# Patient Record
Sex: FEMALE | Race: WHITE | NOT HISPANIC OR LATINO | Employment: OTHER | URBAN - METROPOLITAN AREA
[De-identification: names, ages, dates, MRNs, and addresses within clinical notes are randomized per-mention and may not be internally consistent; named-entity substitution may affect disease eponyms.]

---

## 2005-07-07 LAB — PATHOLOGY ICD PATIENT: NORMAL

## 2017-01-06 ENCOUNTER — OFFICE VISIT (OUTPATIENT)
Dept: FAMILY MEDICINE | Age: 62
End: 2017-01-06

## 2017-01-06 VITALS
WEIGHT: 149 LBS | DIASTOLIC BLOOD PRESSURE: 64 MMHG | SYSTOLIC BLOOD PRESSURE: 98 MMHG | BODY MASS INDEX: 29.59 KG/M2 | HEART RATE: 79 BPM

## 2017-01-06 DIAGNOSIS — R30.0 BURNING WITH URINATION: ICD-10-CM

## 2017-01-06 DIAGNOSIS — N95.2 VAGINAL ATROPHY: Primary | ICD-10-CM

## 2017-01-06 LAB
APPEARANCE UR: CLEAR
BILIRUB UR QL: NORMAL
COLOR UR: YELLOW
GLUCOSE UR-MCNC: NORMAL MG/DL
HGB UR QL: NORMAL
KETONES UR-MCNC: NORMAL MG/DL
LEUKOCYTE ESTERASE UR QL STRIP: NORMAL
NITRITE UR QL: NORMAL
PH UR: 6 [PH]
PROT UR QL: NORMAL
SP GR UR: 1.01
SPECIMEN SOURCE: NORMAL
UROBILINOGEN UR QL: NORMAL

## 2017-01-06 PROCEDURE — 87660 TRICHOMONAS VAGIN DIR PROBE: CPT | Performed by: INTERNAL MEDICINE

## 2017-01-06 PROCEDURE — 87510 GARDNER VAG DNA DIR PROBE: CPT | Performed by: INTERNAL MEDICINE

## 2017-01-06 PROCEDURE — 99213 OFFICE O/P EST LOW 20 MIN: CPT | Performed by: PHYSICIAN ASSISTANT

## 2017-01-06 PROCEDURE — 81002 URINALYSIS NONAUTO W/O SCOPE: CPT | Performed by: PHYSICIAN ASSISTANT

## 2017-01-06 PROCEDURE — 87480 CANDIDA DNA DIR PROBE: CPT | Performed by: INTERNAL MEDICINE

## 2017-01-07 LAB
CANDIDA RRNA VAG QL PROBE: NEGATIVE
G VAGINALIS RRNA GENITAL QL PROBE: NEGATIVE
T VAGINALIS RRNA GENITAL QL PROBE: NEGATIVE

## 2017-01-09 ENCOUNTER — TELEPHONE (OUTPATIENT)
Dept: FAMILY MEDICINE | Age: 62
End: 2017-01-09

## 2017-04-01 ENCOUNTER — APPOINTMENT (OUTPATIENT)
Dept: CT IMAGING | Age: 62
End: 2017-04-01
Attending: EMERGENCY MEDICINE

## 2017-04-01 ENCOUNTER — HOSPITAL ENCOUNTER (EMERGENCY)
Age: 62
Discharge: HOME | End: 2017-04-01
Attending: EMERGENCY MEDICINE

## 2017-04-01 VITALS
OXYGEN SATURATION: 97 % | TEMPERATURE: 98.1 F | RESPIRATION RATE: 14 BRPM | DIASTOLIC BLOOD PRESSURE: 80 MMHG | WEIGHT: 146 LBS | BODY MASS INDEX: 29.43 KG/M2 | SYSTOLIC BLOOD PRESSURE: 140 MMHG | HEIGHT: 59 IN | HEART RATE: 78 BPM

## 2017-04-01 DIAGNOSIS — S06.0XAA CONCUSSION: Primary | ICD-10-CM

## 2017-04-01 LAB
ANION GAP SERPL CALC-SCNC: 12 MMOL/L (ref 10–20)
APTT PPP: 23 SEC (ref 22–30)
BASOPHILS # BLD AUTO: 0 K/MCL (ref 0–0.3)
BASOPHILS NFR BLD AUTO: 0 %
BUN SERPL-MCNC: 26 MG/DL (ref 6–20)
BUN/CREAT SERPL: 28 (ref 7–25)
CALCIUM SERPL-MCNC: 9.2 MG/DL (ref 8.4–10.2)
CHLORIDE SERPL-SCNC: 101 MMOL/L (ref 98–107)
CO2 SERPL-SCNC: 32 MMOL/L (ref 21–32)
CREAT SERPL-MCNC: 0.93 MG/DL (ref 0.51–0.95)
DIFFERENTIAL METHOD BLD: ABNORMAL
EOSINOPHIL # BLD AUTO: 0.1 K/MCL (ref 0.1–0.5)
EOSINOPHIL NFR SPEC: 1 %
ERYTHROCYTE [DISTWIDTH] IN BLOOD: 12.4 % (ref 11–15)
GLUCOSE SERPL-MCNC: 107 MG/DL (ref 65–99)
HCT VFR BLD CALC: 45.5 % (ref 36–46.5)
HGB BLD-MCNC: 16.2 G/DL (ref 12–15.5)
INR PPP: 1
LYMPHOCYTES # BLD MANUAL: 1.3 K/MCL (ref 1–4)
LYMPHOCYTES NFR BLD MANUAL: 14 %
MCH RBC QN AUTO: 30.4 PG (ref 26–34)
MCHC RBC AUTO-ENTMCNC: 35.6 G/DL (ref 32–36.5)
MCV RBC AUTO: 85.4 FL (ref 78–100)
MONOCYTES # BLD MANUAL: 0.7 K/MCL (ref 0.3–0.9)
MONOCYTES NFR BLD MANUAL: 7 %
NEUTROPHILS # BLD AUTO: 7.8 K/MCL (ref 1.8–7.7)
NEUTROPHILS NFR BLD AUTO: 78 %
PLATELET # BLD: 209 K/MCL (ref 140–450)
POTASSIUM SERPL-SCNC: 3.9 MMOL/L (ref 3.4–5.1)
PROTHROMBIN TIME: 11 SEC (ref 9.7–11.8)
RBC # BLD: 5.33 MIL/MCL (ref 4–5.2)
SODIUM SERPL-SCNC: 141 MMOL/L (ref 135–145)
WBC # BLD: 9.9 K/MCL (ref 4.2–11)

## 2017-04-01 PROCEDURE — 36415 COLL VENOUS BLD VENIPUNCTURE: CPT

## 2017-04-01 PROCEDURE — 85025 COMPLETE CBC W/AUTO DIFF WBC: CPT

## 2017-04-01 PROCEDURE — 80048 BASIC METABOLIC PNL TOTAL CA: CPT

## 2017-04-01 PROCEDURE — 85610 PROTHROMBIN TIME: CPT

## 2017-04-01 PROCEDURE — 10002801 HB RX 250 W/O HCPCS

## 2017-04-01 PROCEDURE — 70450 CT HEAD/BRAIN W/O DYE: CPT

## 2017-04-01 PROCEDURE — 85730 THROMBOPLASTIN TIME PARTIAL: CPT

## 2017-04-01 PROCEDURE — 70450 CT HEAD/BRAIN W/O DYE: CPT | Performed by: RADIOLOGY

## 2017-04-01 PROCEDURE — 70486 CT MAXILLOFACIAL W/O DYE: CPT

## 2017-04-01 PROCEDURE — 70486 CT MAXILLOFACIAL W/O DYE: CPT | Performed by: RADIOLOGY

## 2017-04-01 PROCEDURE — 99284 EMERGENCY DEPT VISIT MOD MDM: CPT

## 2017-04-01 PROCEDURE — 99285 EMERGENCY DEPT VISIT HI MDM: CPT | Performed by: EMERGENCY MEDICINE

## 2017-04-01 RX ADMIN — Medication 2 ML: at 10:13

## 2017-04-01 RX ADMIN — Medication 2 ML: at 10:19

## 2017-04-01 ASSESSMENT — PAIN SCALES - GENERAL: PAINLEVEL_OUTOF10: 7

## 2017-04-03 ENCOUNTER — TELEPHONE (OUTPATIENT)
Dept: EMERGENCY MEDICINE | Age: 62
End: 2017-04-03

## 2017-04-07 ENCOUNTER — OFFICE VISIT (OUTPATIENT)
Dept: FAMILY MEDICINE | Age: 62
End: 2017-04-07

## 2017-04-07 VITALS
BODY MASS INDEX: 31.51 KG/M2 | SYSTOLIC BLOOD PRESSURE: 120 MMHG | HEART RATE: 64 BPM | WEIGHT: 156 LBS | DIASTOLIC BLOOD PRESSURE: 68 MMHG

## 2017-04-07 DIAGNOSIS — I10 ESSENTIAL HYPERTENSION: ICD-10-CM

## 2017-04-07 DIAGNOSIS — E78.00 PURE HYPERCHOLESTEROLEMIA: ICD-10-CM

## 2017-04-07 DIAGNOSIS — S06.0X1D CLOSED HEAD INJURY WITH CONCUSSION, WITH LOC OF 30 MIN OR LESS, SUBSEQUENT ENCOUNTER: Primary | ICD-10-CM

## 2017-04-07 DIAGNOSIS — E66.09 NON MORBID OBESITY DUE TO EXCESS CALORIES: ICD-10-CM

## 2017-04-07 DIAGNOSIS — E55.9 VITAMIN D DEFICIENCY: ICD-10-CM

## 2017-04-07 PROCEDURE — 99215 OFFICE O/P EST HI 40 MIN: CPT | Performed by: FAMILY MEDICINE

## 2017-04-07 RX ORDER — TRIAMTERENE AND HYDROCHLOROTHIAZIDE 75; 50 MG/1; MG/1
1 TABLET ORAL DAILY
Qty: 90 TABLET | Refills: 2 | Status: SHIPPED | OUTPATIENT
Start: 2017-04-07 | End: 2017-09-28 | Stop reason: SDUPTHER

## 2017-04-07 RX ORDER — SIMVASTATIN 10 MG
10 TABLET ORAL NIGHTLY
Qty: 90 TABLET | Refills: 2 | Status: SHIPPED | OUTPATIENT
Start: 2017-04-07 | End: 2017-09-28 | Stop reason: SDUPTHER

## 2017-04-09 PROBLEM — E55.9 VITAMIN D DEFICIENCY: Status: ACTIVE | Noted: 2017-04-09

## 2017-04-09 PROBLEM — E78.00 PURE HYPERCHOLESTEROLEMIA: Status: ACTIVE | Noted: 2017-04-09

## 2017-04-14 ENCOUNTER — TELEPHONE (OUTPATIENT)
Dept: FAMILY MEDICINE | Age: 62
End: 2017-04-14

## 2017-04-14 ENCOUNTER — HOSPITAL ENCOUNTER (OUTPATIENT)
Dept: CT IMAGING | Age: 62
Discharge: HOME | End: 2017-04-14
Attending: FAMILY MEDICINE

## 2017-04-14 DIAGNOSIS — S06.0X1D CLOSED HEAD INJURY WITH CONCUSSION, WITH LOC OF 30 MIN OR LESS, SUBSEQUENT ENCOUNTER: ICD-10-CM

## 2017-04-14 PROCEDURE — 70450 CT HEAD/BRAIN W/O DYE: CPT

## 2017-04-14 PROCEDURE — 70450 CT HEAD/BRAIN W/O DYE: CPT | Performed by: RADIOLOGY

## 2017-08-08 ENCOUNTER — TELEPHONE (OUTPATIENT)
Dept: FAMILY MEDICINE | Age: 62
End: 2017-08-08

## 2017-08-10 ENCOUNTER — LAB SERVICES (OUTPATIENT)
Dept: LAB | Age: 62
End: 2017-08-10

## 2017-08-10 DIAGNOSIS — I10 ESSENTIAL HYPERTENSION: ICD-10-CM

## 2017-08-10 DIAGNOSIS — E55.9 VITAMIN D DEFICIENCY: ICD-10-CM

## 2017-08-10 DIAGNOSIS — E78.00 PURE HYPERCHOLESTEROLEMIA: ICD-10-CM

## 2017-08-10 LAB
ALBUMIN SERPL-MCNC: 3.9 G/DL (ref 3.6–5.1)
ALBUMIN/GLOB SERPL: 1.4 {RATIO} (ref 1–2.4)
ALP SERPL-CCNC: 148 UNITS/L (ref 45–117)
ALT SERPL-CCNC: 36 UNITS/L
ANION GAP SERPL CALC-SCNC: 16 MMOL/L (ref 10–20)
AST SERPL-CCNC: 26 UNITS/L
BILIRUB SERPL-MCNC: 0.7 MG/DL (ref 0.2–1)
BUN SERPL-MCNC: 24 MG/DL (ref 6–20)
BUN/CREAT SERPL: 23 (ref 7–25)
CALCIUM SERPL-MCNC: 8.9 MG/DL (ref 8.4–10.2)
CHLORIDE SERPL-SCNC: 98 MMOL/L (ref 98–107)
CHOLEST SERPL-MCNC: 151 MG/DL
CHOLEST/HDLC SERPL: 2.9 {RATIO}
CO2 SERPL-SCNC: 29 MMOL/L (ref 21–32)
CREAT SERPL-MCNC: 1.03 MG/DL (ref 0.51–0.95)
FASTING STATUS PATIENT QL REPORTED: 14 HRS
GLOBULIN SER-MCNC: 2.7 G/DL (ref 2–4)
GLUCOSE SERPL-MCNC: 82 MG/DL (ref 65–99)
HDLC SERPL-MCNC: 52 MG/DL
LDLC SERPL-MCNC: 80 MG/DL
LENGTH OF FAST TIME PATIENT: 14 HRS
NONHDLC SERPL-MCNC: 99 MG/DL
POTASSIUM SERPL-SCNC: 3.5 MMOL/L (ref 3.4–5.1)
PROT SERPL-MCNC: 6.6 G/DL (ref 6.4–8.2)
SODIUM SERPL-SCNC: 139 MMOL/L (ref 135–145)
TRIGL SERPL-MCNC: 95 MG/DL

## 2017-08-10 PROCEDURE — 80053 COMPREHEN METABOLIC PANEL: CPT | Performed by: INTERNAL MEDICINE

## 2017-08-10 PROCEDURE — 36415 COLL VENOUS BLD VENIPUNCTURE: CPT | Performed by: INTERNAL MEDICINE

## 2017-08-10 PROCEDURE — 82306 VITAMIN D 25 HYDROXY: CPT | Performed by: INTERNAL MEDICINE

## 2017-08-10 PROCEDURE — 80061 LIPID PANEL: CPT | Performed by: INTERNAL MEDICINE

## 2017-08-11 LAB — 25(OH)D3+25(OH)D2 SERPL-MCNC: 51.6 NG/ML (ref 30–100)

## 2017-08-14 ENCOUNTER — TELEPHONE (OUTPATIENT)
Dept: FAMILY MEDICINE | Age: 62
End: 2017-08-14

## 2017-08-14 DIAGNOSIS — I10 ESSENTIAL HYPERTENSION: ICD-10-CM

## 2017-08-14 DIAGNOSIS — E78.00 PURE HYPERCHOLESTEROLEMIA: Primary | ICD-10-CM

## 2017-09-18 ENCOUNTER — HOSPITAL ENCOUNTER (OUTPATIENT)
Dept: MAMMOGRAPHY | Age: 62
Discharge: HOME | End: 2017-09-18
Attending: FAMILY MEDICINE

## 2017-09-18 ENCOUNTER — TELEPHONE (OUTPATIENT)
Dept: FAMILY MEDICINE | Age: 62
End: 2017-09-18

## 2017-09-18 DIAGNOSIS — I10 ESSENTIAL HYPERTENSION: Primary | ICD-10-CM

## 2017-09-18 DIAGNOSIS — E78.00 PURE HYPERCHOLESTEROLEMIA: ICD-10-CM

## 2017-09-18 DIAGNOSIS — Z12.31 VISIT FOR SCREENING MAMMOGRAM: ICD-10-CM

## 2017-09-18 PROCEDURE — 77063 BREAST TOMOSYNTHESIS BI: CPT

## 2017-09-18 PROCEDURE — G0202 SCR MAMMO BI INCL CAD: HCPCS | Performed by: RADIOLOGY

## 2017-09-18 PROCEDURE — 77063 BREAST TOMOSYNTHESIS BI: CPT | Performed by: RADIOLOGY

## 2017-09-28 RX ORDER — TRIAMTERENE AND HYDROCHLOROTHIAZIDE 75; 50 MG/1; MG/1
1 TABLET ORAL DAILY
Qty: 90 TABLET | Refills: 1 | Status: SHIPPED | OUTPATIENT
Start: 2017-09-28 | End: 2018-02-19 | Stop reason: SDUPTHER

## 2017-09-28 RX ORDER — SIMVASTATIN 10 MG
10 TABLET ORAL NIGHTLY
Qty: 90 TABLET | Refills: 1 | Status: SHIPPED | OUTPATIENT
Start: 2017-09-28 | End: 2018-03-13 | Stop reason: SDUPTHER

## 2018-01-01 ENCOUNTER — EXTERNAL RECORD (OUTPATIENT)
Dept: OTHER | Age: 63
End: 2018-01-01

## 2018-02-14 ENCOUNTER — TELEPHONE (OUTPATIENT)
Dept: FAMILY MEDICINE | Age: 63
End: 2018-02-14

## 2018-02-15 ENCOUNTER — OFFICE VISIT (OUTPATIENT)
Dept: FAMILY MEDICINE | Age: 63
End: 2018-02-15

## 2018-02-15 VITALS — SYSTOLIC BLOOD PRESSURE: 130 MMHG | HEART RATE: 68 BPM | DIASTOLIC BLOOD PRESSURE: 80 MMHG

## 2018-02-15 DIAGNOSIS — J06.9 VIRAL URI: Primary | ICD-10-CM

## 2018-02-15 PROCEDURE — 99213 OFFICE O/P EST LOW 20 MIN: CPT | Performed by: FAMILY MEDICINE

## 2018-02-15 ASSESSMENT — PATIENT HEALTH QUESTIONNAIRE - PHQ9
5. POOR APPETITE OR OVEREATING: NOT AT ALL
SUM OF ALL RESPONSES TO PHQ QUESTIONS 1-9: 4
SUM OF ALL RESPONSES TO PHQ9 QUESTIONS 1 TO 9: 4
CLINICAL INTERPRETATION OF PHQ2 SCORE: 3
SUM OF ALL RESPONSES TO PHQ9 QUESTIONS 1 AND 2: 3

## 2018-02-19 RX ORDER — TRIAMTERENE AND HYDROCHLOROTHIAZIDE 75; 50 MG/1; MG/1
1 TABLET ORAL DAILY
Qty: 90 TABLET | Refills: 1 | Status: SHIPPED | OUTPATIENT
Start: 2018-02-19 | End: 2018-05-02 | Stop reason: ALTCHOICE

## 2018-03-08 ENCOUNTER — LAB SERVICES (OUTPATIENT)
Dept: LAB | Age: 63
End: 2018-03-08

## 2018-03-08 DIAGNOSIS — E78.00 PURE HYPERCHOLESTEROLEMIA: ICD-10-CM

## 2018-03-08 DIAGNOSIS — I10 ESSENTIAL HYPERTENSION: ICD-10-CM

## 2018-03-08 LAB
ALBUMIN SERPL-MCNC: 4.1 G/DL (ref 3.6–5.1)
ALBUMIN/GLOB SERPL: 1.3 {RATIO} (ref 1–2.4)
ALP SERPL-CCNC: 135 UNITS/L (ref 45–117)
ALT SERPL-CCNC: 35 UNITS/L
ANION GAP SERPL CALC-SCNC: 11 MMOL/L (ref 10–20)
AST SERPL-CCNC: 23 UNITS/L
BILIRUB SERPL-MCNC: 0.8 MG/DL (ref 0.2–1)
BUN SERPL-MCNC: 22 MG/DL (ref 6–20)
BUN/CREAT SERPL: 18 (ref 7–25)
CALCIUM SERPL-MCNC: 9 MG/DL (ref 8.4–10.2)
CHLORIDE SERPL-SCNC: 99 MMOL/L (ref 98–107)
CHOLEST SERPL-MCNC: 176 MG/DL
CHOLEST/HDLC SERPL: 3.4 {RATIO}
CO2 SERPL-SCNC: 32 MMOL/L (ref 21–32)
CREAT SERPL-MCNC: 1.19 MG/DL (ref 0.51–0.95)
FASTING STATUS PATIENT QL REPORTED: ABNORMAL HRS
GLOBULIN SER-MCNC: 3.1 G/DL (ref 2–4)
GLUCOSE SERPL-MCNC: 81 MG/DL (ref 65–99)
HDLC SERPL-MCNC: 52 MG/DL
LDLC SERPL-MCNC: 107 MG/DL
LENGTH OF FAST TIME PATIENT: NORMAL HRS
NONHDLC SERPL-MCNC: 124 MG/DL
POTASSIUM SERPL-SCNC: 3.8 MMOL/L (ref 3.4–5.1)
PROT SERPL-MCNC: 7.2 G/DL (ref 6.4–8.2)
SODIUM SERPL-SCNC: 138 MMOL/L (ref 135–145)
TRIGL SERPL-MCNC: 86 MG/DL

## 2018-03-08 PROCEDURE — 36415 COLL VENOUS BLD VENIPUNCTURE: CPT | Performed by: INTERNAL MEDICINE

## 2018-03-08 PROCEDURE — 80061 LIPID PANEL: CPT | Performed by: INTERNAL MEDICINE

## 2018-03-08 PROCEDURE — 80053 COMPREHEN METABOLIC PANEL: CPT | Performed by: INTERNAL MEDICINE

## 2018-03-09 ENCOUNTER — TELEPHONE (OUTPATIENT)
Dept: FAMILY MEDICINE | Age: 63
End: 2018-03-09

## 2018-03-13 ENCOUNTER — TELEPHONE (OUTPATIENT)
Dept: FAMILY MEDICINE | Age: 63
End: 2018-03-13

## 2018-03-13 ENCOUNTER — OFFICE VISIT (OUTPATIENT)
Dept: FAMILY MEDICINE | Age: 63
End: 2018-03-13

## 2018-03-13 VITALS
DIASTOLIC BLOOD PRESSURE: 80 MMHG | SYSTOLIC BLOOD PRESSURE: 134 MMHG | BODY MASS INDEX: 33.06 KG/M2 | HEART RATE: 80 BPM | WEIGHT: 164 LBS | HEIGHT: 59 IN | RESPIRATION RATE: 16 BRPM

## 2018-03-13 DIAGNOSIS — E78.00 PURE HYPERCHOLESTEROLEMIA: ICD-10-CM

## 2018-03-13 DIAGNOSIS — N18.30 CHRONIC KIDNEY DISEASE (CKD), STAGE III (MODERATE) (CMD): ICD-10-CM

## 2018-03-13 DIAGNOSIS — E55.9 VITAMIN D DEFICIENCY: ICD-10-CM

## 2018-03-13 DIAGNOSIS — Z23 NEED FOR VACCINATION: ICD-10-CM

## 2018-03-13 DIAGNOSIS — I10 ESSENTIAL HYPERTENSION: Primary | ICD-10-CM

## 2018-03-13 PROBLEM — E66.09 CLASS 1 OBESITY DUE TO EXCESS CALORIES WITH SERIOUS COMORBIDITY AND BODY MASS INDEX (BMI) OF 33.0 TO 33.9 IN ADULT: Status: ACTIVE | Noted: 2018-03-13

## 2018-03-13 PROCEDURE — 90714 TD VACC NO PRESV 7 YRS+ IM: CPT | Performed by: FAMILY MEDICINE

## 2018-03-13 PROCEDURE — 90471 IMMUNIZATION ADMIN: CPT | Performed by: FAMILY MEDICINE

## 2018-03-13 PROCEDURE — 99214 OFFICE O/P EST MOD 30 MIN: CPT | Performed by: FAMILY MEDICINE

## 2018-03-13 RX ORDER — SIMVASTATIN 10 MG
10 TABLET ORAL NIGHTLY
Qty: 90 TABLET | Refills: 3 | Status: SHIPPED | OUTPATIENT
Start: 2018-03-13 | End: 2019-03-28 | Stop reason: SDUPTHER

## 2018-03-13 RX ORDER — SIMVASTATIN 10 MG
10 TABLET ORAL NIGHTLY
Qty: 90 TABLET | Refills: 3 | Status: SHIPPED | OUTPATIENT
Start: 2018-03-13 | End: 2018-03-13 | Stop reason: SDUPTHER

## 2018-04-30 ENCOUNTER — LAB SERVICES (OUTPATIENT)
Dept: LAB | Age: 63
End: 2018-04-30

## 2018-04-30 DIAGNOSIS — N18.30 CHRONIC KIDNEY DISEASE (CKD), STAGE III (MODERATE) (CMD): ICD-10-CM

## 2018-04-30 LAB
ANION GAP SERPL CALC-SCNC: 14 MMOL/L (ref 10–20)
APPEARANCE UR: CLEAR
BILIRUB UR QL STRIP: NEGATIVE
BUN SERPL-MCNC: 15 MG/DL (ref 6–20)
BUN/CREAT SERPL: 14 (ref 7–25)
CALCIUM SERPL-MCNC: 9.4 MG/DL (ref 8.4–10.2)
CHLORIDE SERPL-SCNC: 101 MMOL/L (ref 98–107)
CO2 SERPL-SCNC: 29 MMOL/L (ref 21–32)
COLOR UR: YELLOW
CREAT SERPL-MCNC: 1.08 MG/DL (ref 0.51–0.95)
FASTING STATUS PATIENT QL REPORTED: 3.5 HRS
GLUCOSE SERPL-MCNC: 82 MG/DL (ref 65–99)
GLUCOSE UR STRIP-MCNC: NEGATIVE MG/DL
HGB UR QL STRIP: NEGATIVE
KETONES UR STRIP-MCNC: NEGATIVE MG/DL
LEUKOCYTE ESTERASE UR QL STRIP: NEGATIVE
NITRITE UR QL STRIP: NEGATIVE
PH UR STRIP: 7 UNITS (ref 5–7)
POTASSIUM SERPL-SCNC: 3.8 MMOL/L (ref 3.4–5.1)
PROT UR STRIP-MCNC: NEGATIVE MG/DL
SODIUM SERPL-SCNC: 140 MMOL/L (ref 135–145)
SP GR UR STRIP: 1.01 (ref 1–1.03)
SPECIMEN SOURCE: NORMAL
UROBILINOGEN UR STRIP-MCNC: 0.2 MG/DL (ref 0–1)

## 2018-04-30 PROCEDURE — 80048 BASIC METABOLIC PNL TOTAL CA: CPT | Performed by: INTERNAL MEDICINE

## 2018-04-30 PROCEDURE — 36415 COLL VENOUS BLD VENIPUNCTURE: CPT | Performed by: INTERNAL MEDICINE

## 2018-04-30 PROCEDURE — 81003 URINALYSIS AUTO W/O SCOPE: CPT | Performed by: INTERNAL MEDICINE

## 2018-05-02 ENCOUNTER — TELEPHONE (OUTPATIENT)
Dept: FAMILY MEDICINE | Age: 63
End: 2018-05-02

## 2018-05-02 RX ORDER — METOPROLOL SUCCINATE 25 MG/1
25 TABLET, EXTENDED RELEASE ORAL DAILY
Qty: 30 TABLET | Refills: 5 | Status: SHIPPED | OUTPATIENT
Start: 2018-05-02 | End: 2018-09-19 | Stop reason: SDUPTHER

## 2018-05-08 ENCOUNTER — TELEPHONE (OUTPATIENT)
Dept: FAMILY MEDICINE | Age: 63
End: 2018-05-08

## 2018-05-24 ENCOUNTER — TELEPHONE (OUTPATIENT)
Dept: FAMILY MEDICINE | Age: 63
End: 2018-05-24

## 2018-05-24 ENCOUNTER — NURSE ONLY (OUTPATIENT)
Dept: FAMILY MEDICINE | Age: 63
End: 2018-05-24

## 2018-05-24 VITALS — SYSTOLIC BLOOD PRESSURE: 120 MMHG | DIASTOLIC BLOOD PRESSURE: 74 MMHG

## 2018-05-25 RX ORDER — HYDROCHLOROTHIAZIDE 12.5 MG/1
12.5 CAPSULE, GELATIN COATED ORAL DAILY
Qty: 30 CAPSULE | Refills: 5 | Status: SHIPPED | OUTPATIENT
Start: 2018-05-25 | End: 2018-09-19 | Stop reason: SDUPTHER

## 2018-07-03 ENCOUNTER — TELEPHONE (OUTPATIENT)
Dept: FAMILY MEDICINE | Age: 63
End: 2018-07-03

## 2018-07-03 ENCOUNTER — NURSE ONLY (OUTPATIENT)
Dept: FAMILY MEDICINE | Age: 63
End: 2018-07-03

## 2018-07-03 VITALS — DIASTOLIC BLOOD PRESSURE: 66 MMHG | SYSTOLIC BLOOD PRESSURE: 118 MMHG

## 2018-08-01 ENCOUNTER — TELEPHONE (OUTPATIENT)
Dept: FAMILY MEDICINE | Age: 63
End: 2018-08-01

## 2018-08-01 ENCOUNTER — NURSE ONLY (OUTPATIENT)
Dept: FAMILY MEDICINE | Age: 63
End: 2018-08-01

## 2018-08-01 VITALS — DIASTOLIC BLOOD PRESSURE: 74 MMHG | SYSTOLIC BLOOD PRESSURE: 116 MMHG

## 2018-09-13 ENCOUNTER — LAB SERVICES (OUTPATIENT)
Dept: LAB | Age: 63
End: 2018-09-13

## 2018-09-13 ENCOUNTER — HOSPITAL ENCOUNTER (OUTPATIENT)
Dept: MAMMOGRAPHY | Age: 63
Discharge: HOME | End: 2018-09-13
Attending: FAMILY MEDICINE

## 2018-09-13 DIAGNOSIS — E55.9 VITAMIN D DEFICIENCY: ICD-10-CM

## 2018-09-13 DIAGNOSIS — Z12.31 VISIT FOR SCREENING MAMMOGRAM: ICD-10-CM

## 2018-09-13 DIAGNOSIS — E78.00 PURE HYPERCHOLESTEROLEMIA: ICD-10-CM

## 2018-09-13 DIAGNOSIS — I10 ESSENTIAL HYPERTENSION: ICD-10-CM

## 2018-09-13 LAB
ALBUMIN SERPL-MCNC: 4.2 G/DL (ref 3.6–5.1)
ALBUMIN/GLOB SERPL: 1.6 {RATIO} (ref 1–2.4)
ALP SERPL-CCNC: 121 UNITS/L (ref 45–117)
ALT SERPL-CCNC: 33 UNITS/L
ANION GAP SERPL CALC-SCNC: 11 MMOL/L (ref 10–20)
AST SERPL-CCNC: 21 UNITS/L
BILIRUB SERPL-MCNC: 1.1 MG/DL (ref 0.2–1)
BUN SERPL-MCNC: 18 MG/DL (ref 6–20)
BUN/CREAT SERPL: 18 (ref 7–25)
CALCIUM SERPL-MCNC: 9.4 MG/DL (ref 8.4–10.2)
CHLORIDE SERPL-SCNC: 100 MMOL/L (ref 98–107)
CHOLEST SERPL-MCNC: 166 MG/DL
CHOLEST/HDLC SERPL: 3.1 {RATIO}
CO2 SERPL-SCNC: 33 MMOL/L (ref 21–32)
CREAT SERPL-MCNC: 0.99 MG/DL (ref 0.51–0.95)
FASTING STATUS PATIENT QL REPORTED: 16.5 HRS
GLOBULIN SER-MCNC: 2.7 G/DL (ref 2–4)
GLUCOSE SERPL-MCNC: 84 MG/DL (ref 65–99)
HDLC SERPL-MCNC: 53 MG/DL
LDLC SERPL-MCNC: 98 MG/DL
LENGTH OF FAST TIME PATIENT: 16.5 HRS
NONHDLC SERPL-MCNC: 113 MG/DL
POTASSIUM SERPL-SCNC: 3.6 MMOL/L (ref 3.4–5.1)
PROT SERPL-MCNC: 6.9 G/DL (ref 6.4–8.2)
SODIUM SERPL-SCNC: 140 MMOL/L (ref 135–145)
TRIGL SERPL-MCNC: 73 MG/DL

## 2018-09-13 PROCEDURE — 36415 COLL VENOUS BLD VENIPUNCTURE: CPT | Performed by: INTERNAL MEDICINE

## 2018-09-13 PROCEDURE — 82306 VITAMIN D 25 HYDROXY: CPT | Performed by: INTERNAL MEDICINE

## 2018-09-13 PROCEDURE — 77063 BREAST TOMOSYNTHESIS BI: CPT

## 2018-09-13 PROCEDURE — 77063 BREAST TOMOSYNTHESIS BI: CPT | Performed by: RADIOLOGY

## 2018-09-13 PROCEDURE — 80061 LIPID PANEL: CPT | Performed by: INTERNAL MEDICINE

## 2018-09-13 PROCEDURE — 77067 SCR MAMMO BI INCL CAD: CPT | Performed by: RADIOLOGY

## 2018-09-13 PROCEDURE — 80053 COMPREHEN METABOLIC PANEL: CPT | Performed by: INTERNAL MEDICINE

## 2018-09-14 ENCOUNTER — TELEPHONE (OUTPATIENT)
Dept: FAMILY MEDICINE | Age: 63
End: 2018-09-14

## 2018-09-14 LAB — 25(OH)D3+25(OH)D2 SERPL-MCNC: 53.3 NG/ML (ref 30–100)

## 2018-09-19 ENCOUNTER — OFFICE VISIT (OUTPATIENT)
Dept: FAMILY MEDICINE | Age: 63
End: 2018-09-19

## 2018-09-19 VITALS
DIASTOLIC BLOOD PRESSURE: 76 MMHG | HEIGHT: 59 IN | BODY MASS INDEX: 30.96 KG/M2 | WEIGHT: 153.6 LBS | SYSTOLIC BLOOD PRESSURE: 128 MMHG | HEART RATE: 56 BPM

## 2018-09-19 DIAGNOSIS — N18.30 CHRONIC KIDNEY DISEASE (CKD), STAGE III (MODERATE) (CMD): ICD-10-CM

## 2018-09-19 DIAGNOSIS — I10 ESSENTIAL HYPERTENSION: Primary | ICD-10-CM

## 2018-09-19 DIAGNOSIS — E78.00 PURE HYPERCHOLESTEROLEMIA: ICD-10-CM

## 2018-09-19 DIAGNOSIS — E55.9 VITAMIN D DEFICIENCY: ICD-10-CM

## 2018-09-19 DIAGNOSIS — Z23 NEED FOR VACCINATION: ICD-10-CM

## 2018-09-19 DIAGNOSIS — E66.09 CLASS 1 OBESITY DUE TO EXCESS CALORIES WITH SERIOUS COMORBIDITY AND BODY MASS INDEX (BMI) OF 33.0 TO 33.9 IN ADULT: ICD-10-CM

## 2018-09-19 DIAGNOSIS — Z11.59 NEED FOR HEPATITIS C SCREENING TEST: ICD-10-CM

## 2018-09-19 PROCEDURE — 99215 OFFICE O/P EST HI 40 MIN: CPT | Performed by: FAMILY MEDICINE

## 2018-09-19 PROCEDURE — 90688 IIV4 VACCINE SPLT 0.5 ML IM: CPT | Performed by: FAMILY MEDICINE

## 2018-09-19 PROCEDURE — 90471 IMMUNIZATION ADMIN: CPT | Performed by: FAMILY MEDICINE

## 2018-09-19 RX ORDER — METOPROLOL SUCCINATE 25 MG/1
25 TABLET, EXTENDED RELEASE ORAL DAILY
Qty: 90 TABLET | Refills: 3 | Status: SHIPPED | OUTPATIENT
Start: 2018-09-19 | End: 2019-09-05 | Stop reason: SDUPTHER

## 2018-09-19 RX ORDER — HYDROCHLOROTHIAZIDE 12.5 MG/1
12.5 CAPSULE, GELATIN COATED ORAL DAILY
Qty: 90 CAPSULE | Refills: 3 | Status: SHIPPED | OUTPATIENT
Start: 2018-09-19 | End: 2019-08-28 | Stop reason: SDUPTHER

## 2018-10-12 ENCOUNTER — TELEPHONE (OUTPATIENT)
Dept: FAMILY MEDICINE | Age: 63
End: 2018-10-12

## 2018-10-18 ENCOUNTER — OFFICE VISIT (OUTPATIENT)
Dept: FAMILY MEDICINE | Age: 63
End: 2018-10-18

## 2018-10-18 ENCOUNTER — TELEPHONE (OUTPATIENT)
Dept: FAMILY MEDICINE | Age: 63
End: 2018-10-18

## 2018-10-18 VITALS
DIASTOLIC BLOOD PRESSURE: 70 MMHG | SYSTOLIC BLOOD PRESSURE: 110 MMHG | HEIGHT: 59 IN | WEIGHT: 153.2 LBS | RESPIRATION RATE: 15 BRPM | HEART RATE: 61 BPM | TEMPERATURE: 97.6 F | BODY MASS INDEX: 30.88 KG/M2

## 2018-10-18 DIAGNOSIS — Z12.72 VAGINAL PAP SMEAR: Primary | ICD-10-CM

## 2018-10-18 DIAGNOSIS — M79.671 RIGHT FOOT PAIN: Primary | ICD-10-CM

## 2018-10-18 PROCEDURE — 88175 CYTOPATH C/V AUTO FLUID REDO: CPT | Performed by: INTERNAL MEDICINE

## 2018-10-18 PROCEDURE — 87624 HPV HI-RISK TYP POOLED RSLT: CPT | Performed by: INTERNAL MEDICINE

## 2018-10-18 PROCEDURE — 99213 OFFICE O/P EST LOW 20 MIN: CPT | Performed by: NURSE PRACTITIONER

## 2018-10-23 ENCOUNTER — TELEPHONE (OUTPATIENT)
Dept: FAMILY MEDICINE | Age: 63
End: 2018-10-23

## 2018-10-23 LAB — HPV16+18+45 E6+E7MRNA CVX NAA+PROBE: NORMAL

## 2018-11-05 ENCOUNTER — OFFICE VISIT (OUTPATIENT)
Dept: FAMILY MEDICINE | Age: 63
End: 2018-11-05

## 2018-11-05 VITALS
RESPIRATION RATE: 12 BRPM | HEART RATE: 72 BPM | HEIGHT: 59 IN | DIASTOLIC BLOOD PRESSURE: 60 MMHG | BODY MASS INDEX: 31.04 KG/M2 | SYSTOLIC BLOOD PRESSURE: 96 MMHG | WEIGHT: 154 LBS

## 2018-11-05 DIAGNOSIS — Z12.4 PAP SMEAR FOR CERVICAL CANCER SCREENING: Primary | ICD-10-CM

## 2018-11-05 PROCEDURE — 99213 OFFICE O/P EST LOW 20 MIN: CPT | Performed by: FAMILY MEDICINE

## 2018-11-05 PROCEDURE — 87624 HPV HI-RISK TYP POOLED RSLT: CPT | Performed by: INTERNAL MEDICINE

## 2018-11-05 PROCEDURE — 88175 CYTOPATH C/V AUTO FLUID REDO: CPT | Performed by: INTERNAL MEDICINE

## 2018-11-08 LAB — HPV16+18+45 E6+E7MRNA CVX NAA+PROBE: NORMAL

## 2018-11-09 ENCOUNTER — TELEPHONE (OUTPATIENT)
Dept: FAMILY MEDICINE | Age: 63
End: 2018-11-09

## 2018-12-04 ENCOUNTER — APPOINTMENT (OUTPATIENT)
Dept: LAB | Age: 63
End: 2018-12-04

## 2019-01-01 ENCOUNTER — EXTERNAL RECORD (OUTPATIENT)
Dept: OTHER | Age: 64
End: 2019-01-01

## 2019-03-21 ENCOUNTER — LAB SERVICES (OUTPATIENT)
Dept: LAB | Age: 64
End: 2019-03-21

## 2019-03-21 DIAGNOSIS — E78.00 PURE HYPERCHOLESTEROLEMIA: ICD-10-CM

## 2019-03-21 DIAGNOSIS — Z11.59 NEED FOR HEPATITIS C SCREENING TEST: ICD-10-CM

## 2019-03-21 DIAGNOSIS — I10 ESSENTIAL HYPERTENSION: ICD-10-CM

## 2019-03-21 LAB
ALBUMIN SERPL-MCNC: 4.3 G/DL (ref 3.6–5.1)
ALBUMIN/GLOB SERPL: 1.4 {RATIO} (ref 1–2.4)
ALP SERPL-CCNC: 128 UNITS/L (ref 45–117)
ALT SERPL-CCNC: 38 UNITS/L
ANION GAP SERPL CALC-SCNC: 14 MMOL/L (ref 10–20)
AST SERPL-CCNC: 25 UNITS/L
BILIRUB SERPL-MCNC: 0.9 MG/DL (ref 0.2–1)
BUN SERPL-MCNC: 21 MG/DL (ref 6–20)
BUN/CREAT SERPL: 21 (ref 7–25)
CALCIUM SERPL-MCNC: 9.6 MG/DL (ref 8.4–10.2)
CHLORIDE SERPL-SCNC: 100 MMOL/L (ref 98–107)
CHOLEST SERPL-MCNC: 171 MG/DL
CHOLEST/HDLC SERPL: 3.1 {RATIO}
CO2 SERPL-SCNC: 30 MMOL/L (ref 21–32)
CREAT SERPL-MCNC: 1.01 MG/DL (ref 0.51–0.95)
FASTING STATUS PATIENT QL REPORTED: 12 HRS
GLOBULIN SER-MCNC: 3 G/DL (ref 2–4)
GLUCOSE SERPL-MCNC: 82 MG/DL (ref 65–99)
HCV AB SER QL: NEGATIVE
HDLC SERPL-MCNC: 55 MG/DL
LDLC SERPL-MCNC: 104 MG/DL
LENGTH OF FAST TIME PATIENT: 12 HRS
NONHDLC SERPL-MCNC: 116 MG/DL
POTASSIUM SERPL-SCNC: 4.5 MMOL/L (ref 3.4–5.1)
PROT SERPL-MCNC: 7.3 G/DL (ref 6.4–8.2)
SODIUM SERPL-SCNC: 139 MMOL/L (ref 135–145)
TRIGL SERPL-MCNC: 60 MG/DL

## 2019-03-21 PROCEDURE — 36415 COLL VENOUS BLD VENIPUNCTURE: CPT | Performed by: INTERNAL MEDICINE

## 2019-03-21 PROCEDURE — 80053 COMPREHEN METABOLIC PANEL: CPT | Performed by: INTERNAL MEDICINE

## 2019-03-21 PROCEDURE — 80061 LIPID PANEL: CPT | Performed by: INTERNAL MEDICINE

## 2019-03-21 PROCEDURE — 86803 HEPATITIS C AB TEST: CPT | Performed by: INTERNAL MEDICINE

## 2019-03-22 ENCOUNTER — TELEPHONE (OUTPATIENT)
Dept: FAMILY MEDICINE | Age: 64
End: 2019-03-22

## 2019-03-28 ENCOUNTER — OFFICE VISIT (OUTPATIENT)
Dept: FAMILY MEDICINE | Age: 64
End: 2019-03-28

## 2019-03-28 VITALS
SYSTOLIC BLOOD PRESSURE: 112 MMHG | WEIGHT: 161.4 LBS | HEIGHT: 61 IN | DIASTOLIC BLOOD PRESSURE: 68 MMHG | HEART RATE: 64 BPM | BODY MASS INDEX: 30.47 KG/M2

## 2019-03-28 DIAGNOSIS — E66.09 CLASS 1 OBESITY DUE TO EXCESS CALORIES WITH SERIOUS COMORBIDITY AND BODY MASS INDEX (BMI) OF 33.0 TO 33.9 IN ADULT: ICD-10-CM

## 2019-03-28 DIAGNOSIS — I10 ESSENTIAL HYPERTENSION: ICD-10-CM

## 2019-03-28 DIAGNOSIS — N18.30 CHRONIC KIDNEY DISEASE (CKD), STAGE III (MODERATE) (CMD): ICD-10-CM

## 2019-03-28 DIAGNOSIS — E78.00 PURE HYPERCHOLESTEROLEMIA: Primary | ICD-10-CM

## 2019-03-28 DIAGNOSIS — E55.9 VITAMIN D DEFICIENCY: ICD-10-CM

## 2019-03-28 DIAGNOSIS — M15.9 GENERALIZED OSTEOARTHROSIS: ICD-10-CM

## 2019-03-28 PROCEDURE — 99215 OFFICE O/P EST HI 40 MIN: CPT | Performed by: FAMILY MEDICINE

## 2019-03-28 RX ORDER — VITAMIN E 268 MG
400 CAPSULE ORAL DAILY
COMMUNITY

## 2019-03-28 RX ORDER — MULTIVIT WITH MINERALS/LUTEIN
1000 TABLET ORAL DAILY
COMMUNITY

## 2019-03-28 RX ORDER — SIMVASTATIN 10 MG
10 TABLET ORAL NIGHTLY
Qty: 90 TABLET | Refills: 3 | Status: SHIPPED | OUTPATIENT
Start: 2019-03-28 | End: 2019-10-11 | Stop reason: SDUPTHER

## 2019-04-15 RX ORDER — SIMVASTATIN 10 MG
TABLET ORAL
Qty: 90 TABLET | Refills: 3 | OUTPATIENT
Start: 2019-04-15

## 2019-06-27 ENCOUNTER — OFFICE VISIT (OUTPATIENT)
Dept: FAMILY MEDICINE | Age: 64
End: 2019-06-27

## 2019-06-27 VITALS
SYSTOLIC BLOOD PRESSURE: 106 MMHG | HEART RATE: 92 BPM | DIASTOLIC BLOOD PRESSURE: 70 MMHG | WEIGHT: 159.8 LBS | BODY MASS INDEX: 31.37 KG/M2 | HEIGHT: 60 IN

## 2019-06-27 DIAGNOSIS — Z23 NEED FOR VACCINATION: Primary | ICD-10-CM

## 2019-06-27 PROCEDURE — 99213 OFFICE O/P EST LOW 20 MIN: CPT | Performed by: FAMILY MEDICINE

## 2019-06-27 RX ORDER — CYCLOBENZAPRINE HCL 10 MG
10 TABLET ORAL 3 TIMES DAILY PRN
Qty: 25 TABLET | Refills: 0 | Status: SHIPPED | OUTPATIENT
Start: 2019-06-27 | End: 2019-10-11 | Stop reason: ALTCHOICE

## 2019-06-27 ASSESSMENT — PATIENT HEALTH QUESTIONNAIRE - PHQ9
SUM OF ALL RESPONSES TO PHQ9 QUESTIONS 1 AND 2: 0
1. LITTLE INTEREST OR PLEASURE IN DOING THINGS: NOT AT ALL
SUM OF ALL RESPONSES TO PHQ9 QUESTIONS 1 AND 2: 0
2. FEELING DOWN, DEPRESSED OR HOPELESS: NOT AT ALL

## 2019-08-28 ENCOUNTER — TELEPHONE (OUTPATIENT)
Dept: FAMILY MEDICINE | Age: 64
End: 2019-08-28

## 2019-08-28 RX ORDER — HYDROCHLOROTHIAZIDE 12.5 MG/1
12.5 CAPSULE, GELATIN COATED ORAL DAILY
Qty: 90 CAPSULE | Refills: 0 | Status: SHIPPED | OUTPATIENT
Start: 2019-08-28 | End: 2019-10-11 | Stop reason: SDUPTHER

## 2019-09-03 ENCOUNTER — TELEPHONE (OUTPATIENT)
Dept: FAMILY MEDICINE | Age: 64
End: 2019-09-03

## 2019-09-03 RX ORDER — HYDROCHLOROTHIAZIDE 12.5 MG/1
12.5 CAPSULE, GELATIN COATED ORAL DAILY
Qty: 90 CAPSULE | Refills: 0 | Status: CANCELLED | OUTPATIENT
Start: 2019-09-03

## 2019-09-05 ENCOUNTER — TELEPHONE (OUTPATIENT)
Dept: FAMILY MEDICINE | Age: 64
End: 2019-09-05

## 2019-09-05 RX ORDER — METOPROLOL SUCCINATE 25 MG/1
25 TABLET, EXTENDED RELEASE ORAL DAILY
Qty: 90 TABLET | Refills: 0 | Status: SHIPPED | OUTPATIENT
Start: 2019-09-05 | End: 2019-10-11 | Stop reason: SDUPTHER

## 2019-09-10 ENCOUNTER — TELEPHONE (OUTPATIENT)
Dept: FAMILY MEDICINE | Age: 64
End: 2019-09-10

## 2019-09-30 ENCOUNTER — HOSPITAL ENCOUNTER (OUTPATIENT)
Dept: MAMMOGRAPHY | Age: 64
Discharge: HOME | End: 2019-09-30
Attending: FAMILY MEDICINE

## 2019-09-30 DIAGNOSIS — Z12.31 ENCOUNTER FOR SCREENING MAMMOGRAM FOR MALIGNANT NEOPLASM OF BREAST: ICD-10-CM

## 2019-09-30 PROCEDURE — 77067 SCR MAMMO BI INCL CAD: CPT | Performed by: RADIOLOGY

## 2019-09-30 PROCEDURE — 77063 BREAST TOMOSYNTHESIS BI: CPT | Performed by: RADIOLOGY

## 2019-09-30 PROCEDURE — 77063 BREAST TOMOSYNTHESIS BI: CPT

## 2019-10-04 ENCOUNTER — LAB SERVICES (OUTPATIENT)
Dept: LAB | Age: 64
End: 2019-10-04

## 2019-10-04 DIAGNOSIS — E78.00 PURE HYPERCHOLESTEROLEMIA: ICD-10-CM

## 2019-10-04 DIAGNOSIS — I10 ESSENTIAL HYPERTENSION: ICD-10-CM

## 2019-10-04 DIAGNOSIS — E55.9 VITAMIN D DEFICIENCY: ICD-10-CM

## 2019-10-04 LAB
25(OH)D3+25(OH)D2 SERPL-MCNC: 38.9 NG/ML (ref 30–100)
ALBUMIN SERPL-MCNC: 3.7 G/DL (ref 3.6–5.1)
ALBUMIN/GLOB SERPL: 1.4 {RATIO} (ref 1–2.4)
ALP SERPL-CCNC: 116 UNITS/L (ref 45–117)
ALT SERPL-CCNC: 26 UNITS/L
ANION GAP SERPL CALC-SCNC: 13 MMOL/L (ref 10–20)
AST SERPL-CCNC: 17 UNITS/L
BILIRUB SERPL-MCNC: 0.7 MG/DL (ref 0.2–1)
BUN SERPL-MCNC: 24 MG/DL (ref 6–20)
BUN/CREAT SERPL: 22 (ref 7–25)
CALCIUM SERPL-MCNC: 9 MG/DL (ref 8.4–10.2)
CHLORIDE SERPL-SCNC: 100 MMOL/L (ref 98–107)
CHOLEST SERPL-MCNC: 157 MG/DL
CHOLEST/HDLC SERPL: 3.3 {RATIO}
CO2 SERPL-SCNC: 31 MMOL/L (ref 21–32)
CREAT SERPL-MCNC: 1.09 MG/DL (ref 0.51–0.95)
FASTING STATUS PATIENT QL REPORTED: 13 HRS
GLOBULIN SER-MCNC: 2.7 G/DL (ref 2–4)
GLUCOSE SERPL-MCNC: 81 MG/DL (ref 65–99)
HDLC SERPL-MCNC: 48 MG/DL
LDLC SERPL-MCNC: 92 MG/DL
LENGTH OF FAST TIME PATIENT: 13 HRS
NONHDLC SERPL-MCNC: 109 MG/DL
POTASSIUM SERPL-SCNC: 4 MMOL/L (ref 3.4–5.1)
PROT SERPL-MCNC: 6.4 G/DL (ref 6.4–8.2)
SODIUM SERPL-SCNC: 140 MMOL/L (ref 135–145)
TRIGL SERPL-MCNC: 86 MG/DL

## 2019-10-04 PROCEDURE — 36415 COLL VENOUS BLD VENIPUNCTURE: CPT | Performed by: INTERNAL MEDICINE

## 2019-10-04 PROCEDURE — 82306 VITAMIN D 25 HYDROXY: CPT | Performed by: INTERNAL MEDICINE

## 2019-10-04 PROCEDURE — 80053 COMPREHEN METABOLIC PANEL: CPT | Performed by: INTERNAL MEDICINE

## 2019-10-04 PROCEDURE — 80061 LIPID PANEL: CPT | Performed by: INTERNAL MEDICINE

## 2019-10-07 ENCOUNTER — TELEPHONE (OUTPATIENT)
Dept: FAMILY MEDICINE | Age: 64
End: 2019-10-07

## 2019-10-11 ENCOUNTER — OFFICE VISIT (OUTPATIENT)
Dept: FAMILY MEDICINE | Age: 64
End: 2019-10-11

## 2019-10-11 VITALS
SYSTOLIC BLOOD PRESSURE: 118 MMHG | HEART RATE: 68 BPM | DIASTOLIC BLOOD PRESSURE: 70 MMHG | WEIGHT: 149 LBS | BODY MASS INDEX: 29.1 KG/M2

## 2019-10-11 DIAGNOSIS — E55.9 VITAMIN D DEFICIENCY: ICD-10-CM

## 2019-10-11 DIAGNOSIS — I10 ESSENTIAL HYPERTENSION: ICD-10-CM

## 2019-10-11 DIAGNOSIS — N18.30 CHRONIC KIDNEY DISEASE (CKD), STAGE III (MODERATE) (CMD): ICD-10-CM

## 2019-10-11 DIAGNOSIS — M15.9 GENERALIZED OSTEOARTHROSIS: ICD-10-CM

## 2019-10-11 DIAGNOSIS — E66.09 CLASS 1 OBESITY DUE TO EXCESS CALORIES WITH SERIOUS COMORBIDITY AND BODY MASS INDEX (BMI) OF 33.0 TO 33.9 IN ADULT: ICD-10-CM

## 2019-10-11 DIAGNOSIS — E78.00 PURE HYPERCHOLESTEROLEMIA: Primary | ICD-10-CM

## 2019-10-11 PROCEDURE — 99214 OFFICE O/P EST MOD 30 MIN: CPT | Performed by: FAMILY MEDICINE

## 2019-10-11 RX ORDER — SIMVASTATIN 10 MG
10 TABLET ORAL NIGHTLY
Qty: 90 TABLET | Refills: 3 | Status: SHIPPED | OUTPATIENT
Start: 2019-10-11 | End: 2020-02-19 | Stop reason: SDUPTHER

## 2019-10-11 RX ORDER — METOPROLOL SUCCINATE 25 MG/1
25 TABLET, EXTENDED RELEASE ORAL DAILY
Qty: 90 TABLET | Refills: 3 | Status: SHIPPED | OUTPATIENT
Start: 2019-10-11 | End: 2020-02-19 | Stop reason: SDUPTHER

## 2019-10-11 RX ORDER — HYDROCHLOROTHIAZIDE 12.5 MG/1
12.5 CAPSULE, GELATIN COATED ORAL DAILY
Qty: 90 CAPSULE | Refills: 3 | Status: SHIPPED | OUTPATIENT
Start: 2019-10-11 | End: 2020-02-19 | Stop reason: SDUPTHER

## 2019-10-18 ENCOUNTER — TELEPHONE (OUTPATIENT)
Dept: FAMILY MEDICINE | Age: 64
End: 2019-10-18

## 2019-10-18 DIAGNOSIS — N18.30 CHRONIC KIDNEY DISEASE (CKD), STAGE III (MODERATE) (CMD): Primary | ICD-10-CM

## 2019-11-12 ENCOUNTER — PRIOR ORIGINAL RECORDS (OUTPATIENT)
Dept: ADMINISTRATIVE | Age: 64
End: 2019-11-12

## 2019-11-21 ENCOUNTER — TELEPHONE (OUTPATIENT)
Dept: FAMILY MEDICINE | Age: 64
End: 2019-11-21

## 2019-11-21 RX ORDER — HYDROCHLOROTHIAZIDE 12.5 MG/1
12.5 CAPSULE, GELATIN COATED ORAL DAILY
Qty: 90 CAPSULE | Refills: 3 | Status: CANCELLED | OUTPATIENT
Start: 2019-11-21

## 2019-11-21 RX ORDER — METOPROLOL SUCCINATE 25 MG/1
25 TABLET, EXTENDED RELEASE ORAL DAILY
Qty: 90 TABLET | Refills: 3 | Status: CANCELLED | OUTPATIENT
Start: 2019-11-21

## 2019-11-21 RX ORDER — SIMVASTATIN 10 MG
10 TABLET ORAL NIGHTLY
Qty: 90 TABLET | Refills: 3 | Status: CANCELLED | OUTPATIENT
Start: 2019-11-21

## 2020-02-19 ENCOUNTER — TELEPHONE (OUTPATIENT)
Dept: FAMILY MEDICINE | Age: 65
End: 2020-02-19

## 2020-02-19 RX ORDER — METOPROLOL SUCCINATE 25 MG/1
25 TABLET, EXTENDED RELEASE ORAL DAILY
Qty: 90 TABLET | Refills: 2 | Status: SHIPPED | OUTPATIENT
Start: 2020-02-19 | End: 2020-08-18 | Stop reason: SDUPTHER

## 2020-02-19 RX ORDER — HYDROCHLOROTHIAZIDE 12.5 MG/1
12.5 CAPSULE, GELATIN COATED ORAL DAILY
Qty: 90 CAPSULE | Refills: 2 | Status: SHIPPED | OUTPATIENT
Start: 2020-02-19 | End: 2020-08-18 | Stop reason: SDUPTHER

## 2020-02-19 RX ORDER — SIMVASTATIN 10 MG
10 TABLET ORAL NIGHTLY
Qty: 90 TABLET | Refills: 2 | Status: SHIPPED | OUTPATIENT
Start: 2020-02-19 | End: 2020-08-18 | Stop reason: SDUPTHER

## 2020-04-07 ENCOUNTER — APPOINTMENT (OUTPATIENT)
Dept: LAB | Age: 65
End: 2020-04-07

## 2020-04-14 ENCOUNTER — APPOINTMENT (OUTPATIENT)
Dept: FAMILY MEDICINE | Age: 65
End: 2020-04-14

## 2020-07-02 ENCOUNTER — E-ADVICE (OUTPATIENT)
Dept: FAMILY MEDICINE | Age: 65
End: 2020-07-02

## 2020-07-02 ENCOUNTER — TELEPHONE (OUTPATIENT)
Dept: FAMILY MEDICINE | Age: 65
End: 2020-07-02

## 2020-07-09 ENCOUNTER — TELEPHONE (OUTPATIENT)
Dept: FAMILY MEDICINE | Age: 65
End: 2020-07-09

## 2020-07-16 ENCOUNTER — LAB SERVICES (OUTPATIENT)
Dept: LAB | Age: 65
End: 2020-07-16

## 2020-07-16 DIAGNOSIS — N18.30 CHRONIC KIDNEY DISEASE (CKD), STAGE III (MODERATE) (CMD): ICD-10-CM

## 2020-07-16 DIAGNOSIS — E78.00 PURE HYPERCHOLESTEROLEMIA: ICD-10-CM

## 2020-07-16 LAB
ALBUMIN SERPL-MCNC: 3.9 G/DL (ref 3.6–5.1)
ALBUMIN/GLOB SERPL: 1.4 {RATIO} (ref 1–2.4)
ALP SERPL-CCNC: 122 UNITS/L (ref 45–117)
ALT SERPL-CCNC: 48 UNITS/L
ANION GAP SERPL CALC-SCNC: 13 MMOL/L (ref 10–20)
APPEARANCE UR: CLEAR
AST SERPL-CCNC: 31 UNITS/L
BILIRUB SERPL-MCNC: 0.7 MG/DL (ref 0.2–1)
BILIRUB UR QL STRIP: NEGATIVE
BUN SERPL-MCNC: 22 MG/DL (ref 6–20)
BUN/CREAT SERPL: 22 (ref 7–25)
CALCIUM SERPL-MCNC: 8.4 MG/DL (ref 8.4–10.2)
CHLORIDE SERPL-SCNC: 99 MMOL/L (ref 98–107)
CHOLEST SERPL-MCNC: 154 MG/DL
CHOLEST/HDLC SERPL: 3.1 {RATIO}
CO2 SERPL-SCNC: 30 MMOL/L (ref 21–32)
COLOR UR: ABNORMAL
CREAT SERPL-MCNC: 0.98 MG/DL (ref 0.51–0.95)
FASTING DURATION TIME PATIENT: 13 HOURS
FASTING DURATION TIME PATIENT: NORMAL H
GFR SERPLBLD BASED ON 1.73 SQ M-ARVRAT: 61 ML/MIN/1.73M2
GLOBULIN SER-MCNC: 2.7 G/DL (ref 2–4)
GLUCOSE SERPL-MCNC: 85 MG/DL (ref 65–99)
GLUCOSE UR STRIP-MCNC: NEGATIVE MG/DL
HDLC SERPL-MCNC: 50 MG/DL
HGB UR QL STRIP: NEGATIVE
KETONES UR STRIP-MCNC: NEGATIVE MG/DL
LDLC SERPL CALC-MCNC: 88 MG/DL
LEUKOCYTE ESTERASE UR QL STRIP: NEGATIVE
NITRITE UR QL STRIP: NEGATIVE
NONHDLC SERPL-MCNC: 104 MG/DL
PH UR STRIP: 7 [PH] (ref 5–7)
POTASSIUM SERPL-SCNC: 4.1 MMOL/L (ref 3.4–5.1)
PROT SERPL-MCNC: 6.6 G/DL (ref 6.4–8.2)
PROT UR STRIP-MCNC: NEGATIVE MG/DL
SODIUM SERPL-SCNC: 138 MMOL/L (ref 135–145)
SP GR UR STRIP: <1.005 (ref 1–1.03)
TRIGL SERPL-MCNC: 82 MG/DL
UROBILINOGEN UR STRIP-MCNC: 0.2 MG/DL

## 2020-07-16 PROCEDURE — 81003 URINALYSIS AUTO W/O SCOPE: CPT | Performed by: CLINICAL MEDICAL LABORATORY

## 2020-07-16 PROCEDURE — 80061 LIPID PANEL: CPT | Performed by: CLINICAL MEDICAL LABORATORY

## 2020-07-16 PROCEDURE — 36415 COLL VENOUS BLD VENIPUNCTURE: CPT | Performed by: INTERNAL MEDICINE

## 2020-07-16 PROCEDURE — 80053 COMPREHEN METABOLIC PANEL: CPT | Performed by: CLINICAL MEDICAL LABORATORY

## 2020-07-17 ENCOUNTER — TELEPHONE (OUTPATIENT)
Dept: FAMILY MEDICINE | Age: 65
End: 2020-07-17

## 2020-07-23 ENCOUNTER — OFFICE VISIT (OUTPATIENT)
Dept: FAMILY MEDICINE | Age: 65
End: 2020-07-23

## 2020-07-23 VITALS
WEIGHT: 160 LBS | BODY MASS INDEX: 31.25 KG/M2 | SYSTOLIC BLOOD PRESSURE: 128 MMHG | HEART RATE: 62 BPM | DIASTOLIC BLOOD PRESSURE: 72 MMHG

## 2020-07-23 DIAGNOSIS — E66.09 CLASS 1 OBESITY DUE TO EXCESS CALORIES WITH SERIOUS COMORBIDITY AND BODY MASS INDEX (BMI) OF 33.0 TO 33.9 IN ADULT: ICD-10-CM

## 2020-07-23 DIAGNOSIS — E55.9 VITAMIN D DEFICIENCY: ICD-10-CM

## 2020-07-23 DIAGNOSIS — N18.30 CHRONIC KIDNEY DISEASE (CKD), STAGE III (MODERATE) (CMD): ICD-10-CM

## 2020-07-23 DIAGNOSIS — I10 ESSENTIAL HYPERTENSION: ICD-10-CM

## 2020-07-23 DIAGNOSIS — E78.00 PURE HYPERCHOLESTEROLEMIA: Primary | ICD-10-CM

## 2020-07-23 DIAGNOSIS — M15.9 GENERALIZED OSTEOARTHROSIS: ICD-10-CM

## 2020-07-23 PROCEDURE — 99214 OFFICE O/P EST MOD 30 MIN: CPT | Performed by: FAMILY MEDICINE

## 2020-07-23 RX ORDER — TRIAMCINOLONE ACETONIDE 0.25 MG/G
CREAM TOPICAL
Qty: 30 G | Refills: 1 | Status: SHIPPED | OUTPATIENT
Start: 2020-07-23 | End: 2020-07-23 | Stop reason: SDUPTHER

## 2020-07-23 RX ORDER — TRIAMCINOLONE ACETONIDE 0.25 MG/G
CREAM TOPICAL
Qty: 30 G | Refills: 1 | Status: SHIPPED | OUTPATIENT
Start: 2020-07-23

## 2020-07-23 ASSESSMENT — PATIENT HEALTH QUESTIONNAIRE - PHQ9
1. LITTLE INTEREST OR PLEASURE IN DOING THINGS: SEVERAL DAYS
SUM OF ALL RESPONSES TO PHQ9 QUESTIONS 1 AND 2: 1
SUM OF ALL RESPONSES TO PHQ9 QUESTIONS 1 AND 2: 1
2. FEELING DOWN, DEPRESSED OR HOPELESS: NOT AT ALL
CLINICAL INTERPRETATION OF PHQ9 SCORE: NO FURTHER SCREENING NEEDED
CLINICAL INTERPRETATION OF PHQ2 SCORE: NO FURTHER SCREENING NEEDED

## 2020-08-18 ENCOUNTER — TELEPHONE (OUTPATIENT)
Dept: FAMILY MEDICINE | Age: 65
End: 2020-08-18

## 2020-08-18 RX ORDER — HYDROCHLOROTHIAZIDE 12.5 MG/1
12.5 CAPSULE, GELATIN COATED ORAL DAILY
Qty: 90 CAPSULE | Refills: 0 | Status: SHIPPED | OUTPATIENT
Start: 2020-08-18

## 2020-08-18 RX ORDER — METOPROLOL SUCCINATE 25 MG/1
25 TABLET, EXTENDED RELEASE ORAL DAILY
Qty: 90 TABLET | Refills: 0 | Status: SHIPPED | OUTPATIENT
Start: 2020-08-18

## 2020-08-18 RX ORDER — SIMVASTATIN 10 MG
10 TABLET ORAL NIGHTLY
Qty: 90 TABLET | Refills: 0 | Status: SHIPPED | OUTPATIENT
Start: 2020-08-18

## 2020-09-29 ENCOUNTER — OFFICE VISIT (OUTPATIENT)
Dept: FAMILY MEDICINE | Age: 65
End: 2020-09-29

## 2020-09-29 VITALS
OXYGEN SATURATION: 99 % | HEART RATE: 60 BPM | HEIGHT: 60 IN | DIASTOLIC BLOOD PRESSURE: 78 MMHG | TEMPERATURE: 97.5 F | SYSTOLIC BLOOD PRESSURE: 118 MMHG | RESPIRATION RATE: 16 BRPM | BODY MASS INDEX: 31.69 KG/M2 | WEIGHT: 161.4 LBS

## 2020-09-29 DIAGNOSIS — N18.30 CHRONIC KIDNEY DISEASE (CKD), STAGE III (MODERATE) (CMD): ICD-10-CM

## 2020-09-29 DIAGNOSIS — I10 ESSENTIAL HYPERTENSION: ICD-10-CM

## 2020-09-29 DIAGNOSIS — E78.00 PURE HYPERCHOLESTEROLEMIA: ICD-10-CM

## 2020-09-29 DIAGNOSIS — Z28.21 REFUSED INFLUENZA VACCINE: ICD-10-CM

## 2020-09-29 DIAGNOSIS — Z00.00 MEDICARE WELCOME VISIT: Primary | ICD-10-CM

## 2020-09-29 PROCEDURE — G0402 INITIAL PREVENTIVE EXAM: HCPCS | Performed by: NURSE PRACTITIONER

## 2020-09-29 PROCEDURE — 99214 OFFICE O/P EST MOD 30 MIN: CPT | Performed by: NURSE PRACTITIONER

## 2020-10-29 ENCOUNTER — HOSPITAL ENCOUNTER (INPATIENT)
Dept: HOSPITAL 61 - ER | Age: 65
LOS: 5 days | Discharge: HOME | DRG: 177 | End: 2020-11-03
Attending: INTERNAL MEDICINE | Admitting: INTERNAL MEDICINE
Payer: COMMERCIAL

## 2020-10-29 VITALS — DIASTOLIC BLOOD PRESSURE: 57 MMHG | SYSTOLIC BLOOD PRESSURE: 110 MMHG

## 2020-10-29 VITALS — SYSTOLIC BLOOD PRESSURE: 116 MMHG | DIASTOLIC BLOOD PRESSURE: 57 MMHG

## 2020-10-29 VITALS — HEIGHT: 63 IN | WEIGHT: 293 LBS | BODY MASS INDEX: 51.91 KG/M2

## 2020-10-29 VITALS — SYSTOLIC BLOOD PRESSURE: 115 MMHG | DIASTOLIC BLOOD PRESSURE: 59 MMHG

## 2020-10-29 VITALS — SYSTOLIC BLOOD PRESSURE: 125 MMHG | DIASTOLIC BLOOD PRESSURE: 68 MMHG

## 2020-10-29 VITALS — DIASTOLIC BLOOD PRESSURE: 62 MMHG | SYSTOLIC BLOOD PRESSURE: 113 MMHG

## 2020-10-29 DIAGNOSIS — J44.0: ICD-10-CM

## 2020-10-29 DIAGNOSIS — A41.9: ICD-10-CM

## 2020-10-29 DIAGNOSIS — U07.1: Primary | ICD-10-CM

## 2020-10-29 DIAGNOSIS — J96.01: ICD-10-CM

## 2020-10-29 DIAGNOSIS — I11.0: ICD-10-CM

## 2020-10-29 DIAGNOSIS — Z88.8: ICD-10-CM

## 2020-10-29 DIAGNOSIS — K21.9: ICD-10-CM

## 2020-10-29 DIAGNOSIS — E66.01: ICD-10-CM

## 2020-10-29 DIAGNOSIS — Z82.49: ICD-10-CM

## 2020-10-29 DIAGNOSIS — E43: ICD-10-CM

## 2020-10-29 DIAGNOSIS — I50.32: ICD-10-CM

## 2020-10-29 DIAGNOSIS — F17.201: ICD-10-CM

## 2020-10-29 DIAGNOSIS — R73.9: ICD-10-CM

## 2020-10-29 DIAGNOSIS — E87.6: ICD-10-CM

## 2020-10-29 DIAGNOSIS — E78.5: ICD-10-CM

## 2020-10-29 DIAGNOSIS — J12.89: ICD-10-CM

## 2020-10-29 DIAGNOSIS — Z95.5: ICD-10-CM

## 2020-10-29 DIAGNOSIS — I25.10: ICD-10-CM

## 2020-10-29 LAB
ALBUMIN SERPL-MCNC: 2.7 G/DL (ref 3.4–5)
ALBUMIN/GLOB SERPL: 0.7 {RATIO} (ref 1–1.7)
ALP SERPL-CCNC: 57 U/L (ref 46–116)
ALT SERPL-CCNC: 19 U/L (ref 14–59)
ANION GAP SERPL CALC-SCNC: 7 MMOL/L (ref 6–14)
AST SERPL-CCNC: 29 U/L (ref 15–37)
BASOPHILS # BLD AUTO: 0 X10^3/UL (ref 0–0.2)
BASOPHILS NFR BLD: 1 % (ref 0–3)
BILIRUB SERPL-MCNC: 0.8 MG/DL (ref 0.2–1)
BUN SERPL-MCNC: 12 MG/DL (ref 7–20)
BUN/CREAT SERPL: 11 (ref 6–20)
CALCIUM SERPL-MCNC: 7.5 MG/DL (ref 8.5–10.1)
CHLORIDE SERPL-SCNC: 103 MMOL/L (ref 98–107)
CO2 SERPL-SCNC: 30 MMOL/L (ref 21–32)
CREAT SERPL-MCNC: 1.1 MG/DL (ref 0.6–1)
EOSINOPHIL NFR BLD: 0 % (ref 0–3)
EOSINOPHIL NFR BLD: 0 X10^3/UL (ref 0–0.7)
ERYTHROCYTE [DISTWIDTH] IN BLOOD BY AUTOMATED COUNT: 14.1 % (ref 11.5–14.5)
GFR SERPLBLD BASED ON 1.73 SQ M-ARVRAT: 49.8 ML/MIN
GLUCOSE SERPL-MCNC: 153 MG/DL (ref 70–99)
HCT VFR BLD CALC: 39.7 % (ref 36–47)
HGB BLD-MCNC: 13.5 G/DL (ref 12–15.5)
INFLUENZA A PATIENT: NEGATIVE
INFLUENZA B PATIENT: NEGATIVE
LYMPHOCYTES # BLD: 0.7 X10^3/UL (ref 1–4.8)
LYMPHOCYTES NFR BLD AUTO: 12 % (ref 24–48)
MCH RBC QN AUTO: 29 PG (ref 25–35)
MCHC RBC AUTO-ENTMCNC: 34 G/DL (ref 31–37)
MCV RBC AUTO: 84 FL (ref 79–100)
MONO #: 0.5 X10^3/UL (ref 0–1.1)
MONOCYTES NFR BLD: 8 % (ref 0–9)
NEUT #: 4.7 X10^3/UL (ref 1.8–7.7)
NEUTROPHILS NFR BLD AUTO: 80 % (ref 31–73)
PLATELET # BLD AUTO: 183 X10^3/UL (ref 140–400)
POTASSIUM SERPL-SCNC: 3.3 MMOL/L (ref 3.5–5.1)
PROT SERPL-MCNC: 6.7 G/DL (ref 6.4–8.2)
RBC # BLD AUTO: 4.73 X10^6/UL (ref 3.5–5.4)
SODIUM SERPL-SCNC: 140 MMOL/L (ref 136–145)
WBC # BLD AUTO: 5.9 X10^3/UL (ref 4–11)

## 2020-10-29 PROCEDURE — 80053 COMPREHEN METABOLIC PANEL: CPT

## 2020-10-29 PROCEDURE — 80048 BASIC METABOLIC PNL TOTAL CA: CPT

## 2020-10-29 PROCEDURE — 96365 THER/PROPH/DIAG IV INF INIT: CPT

## 2020-10-29 PROCEDURE — U0003 INFECTIOUS AGENT DETECTION BY NUCLEIC ACID (DNA OR RNA); SEVERE ACUTE RESPIRATORY SYNDROME CORONAVIRUS 2 (SARS-COV-2) (CORONAVIRUS DISEASE [COVID-19]), AMPLIFIED PROBE TECHNIQUE, MAKING USE OF HIGH THROUGHPUT TECHNOLOGIES AS DESCRIBED BY CMS-2020-01-R: HCPCS

## 2020-10-29 PROCEDURE — 86850 RBC ANTIBODY SCREEN: CPT

## 2020-10-29 PROCEDURE — 80076 HEPATIC FUNCTION PANEL: CPT

## 2020-10-29 PROCEDURE — 84443 ASSAY THYROID STIM HORMONE: CPT

## 2020-10-29 PROCEDURE — 96375 TX/PRO/DX INJ NEW DRUG ADDON: CPT

## 2020-10-29 PROCEDURE — 84484 ASSAY OF TROPONIN QUANT: CPT

## 2020-10-29 PROCEDURE — 86901 BLOOD TYPING SEROLOGIC RH(D): CPT

## 2020-10-29 PROCEDURE — 93005 ELECTROCARDIOGRAM TRACING: CPT

## 2020-10-29 PROCEDURE — 83880 ASSAY OF NATRIURETIC PEPTIDE: CPT

## 2020-10-29 PROCEDURE — P9017 PLASMA 1 DONOR FRZ W/IN 8 HR: HCPCS

## 2020-10-29 PROCEDURE — 71045 X-RAY EXAM CHEST 1 VIEW: CPT

## 2020-10-29 PROCEDURE — 87804 INFLUENZA ASSAY W/OPTIC: CPT

## 2020-10-29 PROCEDURE — 36415 COLL VENOUS BLD VENIPUNCTURE: CPT

## 2020-10-29 PROCEDURE — 85379 FIBRIN DEGRADATION QUANT: CPT

## 2020-10-29 PROCEDURE — 94618 PULMONARY STRESS TESTING: CPT

## 2020-10-29 PROCEDURE — 36569 INSJ PICC 5 YR+ W/O IMAGING: CPT

## 2020-10-29 PROCEDURE — 83036 HEMOGLOBIN GLYCOSYLATED A1C: CPT

## 2020-10-29 PROCEDURE — 87040 BLOOD CULTURE FOR BACTERIA: CPT

## 2020-10-29 PROCEDURE — 86927 PLASMA FRESH FROZEN: CPT

## 2020-10-29 PROCEDURE — G0378 HOSPITAL OBSERVATION PER HR: HCPCS

## 2020-10-29 PROCEDURE — 83605 ASSAY OF LACTIC ACID: CPT

## 2020-10-29 PROCEDURE — 94640 AIRWAY INHALATION TREATMENT: CPT

## 2020-10-29 PROCEDURE — 85025 COMPLETE CBC W/AUTO DIFF WBC: CPT

## 2020-10-29 PROCEDURE — 86900 BLOOD TYPING SEROLOGIC ABO: CPT

## 2020-10-29 PROCEDURE — 82962 GLUCOSE BLOOD TEST: CPT

## 2020-10-29 PROCEDURE — 83735 ASSAY OF MAGNESIUM: CPT

## 2020-10-29 RX ADMIN — CARVEDILOL SCH MG: 6.25 TABLET, FILM COATED ORAL at 17:13

## 2020-10-29 RX ADMIN — PANTOPRAZOLE SODIUM SCH MG: 40 TABLET, DELAYED RELEASE ORAL at 17:14

## 2020-10-29 RX ADMIN — ATORVASTATIN CALCIUM SCH MG: 20 TABLET, FILM COATED ORAL at 21:31

## 2020-10-29 RX ADMIN — DOXYCYCLINE SCH MLS/HR: 100 INJECTION, POWDER, LYOPHILIZED, FOR SOLUTION INTRAVENOUS at 17:12

## 2020-10-29 RX ADMIN — ENOXAPARIN SODIUM SCH MG: 100 INJECTION SUBCUTANEOUS at 21:32

## 2020-10-29 RX ADMIN — ENOXAPARIN SODIUM SCH MG: 100 INJECTION SUBCUTANEOUS at 10:17

## 2020-10-29 RX ADMIN — NYSTATIN SCH APP: 100000 POWDER TOPICAL at 21:31

## 2020-10-29 RX ADMIN — LOSARTAN POTASSIUM SCH MG: 50 TABLET ORAL at 17:13

## 2020-10-29 NOTE — PDOC1
History and Physical


Date of Admission


Date of Admission


DATE: 10/29/20 


TIME: 08:20





Identification/Chief Complaint


Chief Complaint


Shortness of breath





Source


Source:  Patient





History of Present Illness


History of Present Illness


Ms Pal is a 64 yo F w/ PMHx CAD s/p LAD stent x1 in , diastolic CHF, GERD, 

HLD, HTN, morbid obesity who p/w progressive shortness of breath.  Patient 

states shortness of breath has been ongoing since Thursday 10/22/2020.  She 

states shortness of breath is been progressively becoming worse.  She states she

has associated cough with some sputum production. No significant orthopnea or 

PND or LE edema. She states she has had fevers and chills.  Over the last 

several hours patient states shortness of breath is greatly increased.  On 

arrival she was tachypneic and hypoxic on room air with a saturations in the 80s

requiring O2 administration and febrile to 100.6 F.





Patient states her mother recently passed away and had  on .  

She states her mother was hospitalized for prolonged period of time on the Covid

floor at [facility] but states her mother was never diagnosed with 

Covid.  The  was indoors and many people including visitors from 

[place] refused to wear masks.





EKG with heart rate 87 no ST elevation no ST depression no acute MI


CXR with bilateral interstitial opacities, cardiomegaly and coronary calcific

ations


WBC 5.9, Hb 13.5, platelets 183, , K3.3, BUN 12, CR 1.1, glucose 153, 

lactate 1.4, albumin 2.7, troponin 0.


Admitted for further care





Past Medical History


Cardiovascular:  CAD, CHF, HTN, MI, Hyperlipidemia





Past Surgical History


Past Surgical History:  Other (Coronary stenting)





Family History


Family History:  High Cholestrol, Hypertension





Social History


Smoke:  Quit


ALCOHOL:  none


Drugs:  None





Current Problem List


Problem List


Problems


Medical Problems:


(1) Dyspnea


Status: Acute  





(2) Hypoxia


Status: Acute  





(3) Person under investigation for COVID-19


Status: Acute  











Current Medications


Current Medications





Current Medications


Ondansetron HCl (Zofran) 4 mg PRN Q8HRS  PRN IV NAUSEA/VOMITING Last 

administered on 10/29/20at 06:55;  Start 10/29/20 at 04:45;  Stop 10/30/20 at 

04:44


Morphine Sulfate (Morphine Sulfate) 2 mg PRN Q2HR  PRN IV PAIN;  Start 10/29/20 

at 04:45;  Stop 10/30/20 at 04:44


Dexamethasone Sodium Phosphate (Decadron) 10 mg 1X  ONCE IVP  Last administered 

on 10/29/20at 06:48;  Start 10/29/20 at 05:00;  Stop 10/29/20 at 05:07;  Status 

DC


Azithromycin 250 ml @  250 mls/hr 1X  ONCE IV  Last administered on 10/29/20at 

06:49;  Start 10/29/20 at 05:00;  Stop 10/29/20 at 05:59;  Status DC


Ceftriaxone Sodium (Rocephin) 1 gm 1X  ONCE IVP  Last administered on 10/29/20at

06:51;  Start 10/29/20 at 06:00;  Stop 10/29/20 at 06:01;  Status DC





Allergies


Allergies:  


Coded Allergies:  


     acetaminophen (Verified  Allergy, Unknown, rash, 10/29/20)


     oxycodone (Verified  Allergy, Unknown, rash, 10/29/20)





ROS


General:  YES: Fatigue, Malaise, Appetite; 


   No: Chills, Night Sweats, Other


PSYCHOLOGICAL ROS:  No: Anxiety, Behavioral Disorder, Concentration difficultie,

Decreased libido, Depression, Disorientation, Hallucinations, Hostility, 

Irritablity, Memory difficulties, Mood Swings, Obsessive thoughts, Physical 

abuse, Sexual abuse, Sleep disturbances, Suicidal ideation, Other


Eyes:  No Blurry vision, No Decreased vision, No Double vision, No Dry eyes, No 

Excessive tearing, No Eye Pain, No Itchy Eyes, No Loss of vision, No 

Photophobia, No Scotomata, No Uses contacts, No Uses glasses, No Other


HEENT:  No: Heacaches, Visual Changes, Hearing change, Nasal congestion, Nasal 

discharge, Oral lesions, Sinus pain, Sore Throat, Epistaxis, Sneezing, Snoring, 

Tinnitus, Vertigo, Vocal changes, Other


ALLERGY AND IMMUNOLOGY:  No: Hives, Insect Bite Sensitivity, Itchy/Watery Eyes, 

Nasal Congestion, Post Nasal Drip, Seasonal Allergies, Other


Hematological and Lymphatic:  No: Bleeding Problems, Blood Clots, Blood Tr

ansfusions, Brusing, Night Sweats, Pallor, Swollen Lymph Nodes, Other


ENDOCRINE:  No: Breast Changes, Galactorrhea, Hair Pattern Changes, Hot Flashes,

Malaise/lethargy, Mood Swings, Palpitations, Polydipsia/polyuria, Skin Changes, 

Temperature Intolerance, Unexpected Weight Changes, Other


Breast:  No New/Changing Breast Lumps, No Nipple changes, No Nipple discharge, 

No Other


Respiratory:  YES: Cough, Shortness of breath, SOB with excertion, Tachypnea; 


   No: Hemoptysis, Orthopnea, Pleuritic Pain, Sputum Changes, Stridor, Wheezing,

Other


Cardiovascular:  No Chest Pain, No Palpitations, No Orthopnea, No Paroxysmal 

Noc. Dyspnea, No Edema, No Lt Headedness, No Other


Gastrointestinal:  No Nausea, No Vomiting, No Abdominal Pain, No Diarrhea, No 

Constipation, No Melena, No Hematochezia, No Other


Genitourinary:  No Dysuria, No Frequency, No Incontinence, No Hematuria, No 

Retention, No Discharge, No Urgency, No Pain, No Flank Pain, No Other, No , No ,

No , No , No , No , No 


Musculoskeletal:  No Gait Disturbance, No Joint Pain, No Joint Stiffness, No 

Joint Swelling, No Muscle Pain, No Muscular Weakness, No Pain In:, No Swelling 

In:, No Other


Neurological:  No Behavorial Changes, No Bowel/Bladder ControlChng, No 

Confusion, No Dizziness, No Gait Disturbance, No Headaches, No Impaired 

Coord/balance, No Memory Loss, No Numbness/Tingling, No Seizures, No Speech 

Problems, No Tremors, No Visual Changes, No Weakness, No Other


Skin:  No Dry Skin, No Eczema, No Hair Changes, No Lumps, No Mole Changes, No 

Mottling, No Nail Changes, No Pruritus, No Rash, No Skin Lesion Changes, No 

Other, No Acne





Physical Exam


General:  Alert, Oriented X3, Cooperative, mild distress


HEENT:  Atraumatic, PERRLA, EOMI, Mucous membr. moist/pink


Lungs:  Other (Bilateral rhonchi and wheezes)


Heart:  S1S2, RRR, no thrills, no rubs, no gallops, no murmurs


Abdomen:  Normal bowel sounds, Soft, No tenderness, No hepatosplenomegaly, No 

masses


Rectal Exam:  not examined


Extremities:  No clubbing, No cyanosis, No edema, Normal pulses, No 

tenderness/swelling


Skin:  No rashes, No breakdown, No significant lesion


Neuro:  Normal gait, Normal speech, Strength at 5/5 X4 ext, Normal tone, 

Sensation intact, Cranial nerves 3-12 NL, Reflexes 2+


Psych/Mental Status:  Mental status NL, Mood NL





Vitals


Vitals





Vital Signs








  Date Time  Temp Pulse Resp B/P (MAP) Pulse Ox O2 Delivery O2 Flow Rate FiO2


 


10/29/20 06:31  77 48 94/65 (75) 99 Nasal Cannula 4.0 


 


10/29/20 04:18 100.6       





 100.6       











Labs


Labs





Laboratory Tests








Test


 10/29/20


04:45 10/29/20


05:00


 


White Blood Count


 5.9 x10^3/uL


(4.0-11.0) 





 


Red Blood Count


 4.73 x10^6/uL


(3.50-5.40) 





 


Hemoglobin


 13.5 g/dL


(12.0-15.5) 





 


Hematocrit


 39.7 %


(36.0-47.0) 





 


Mean Corpuscular Volume 84 fL ()  


 


Mean Corpuscular Hemoglobin 29 pg (25-35)  


 


Mean Corpuscular Hemoglobin


Concent 34 g/dL


(31-37) 





 


Red Cell Distribution Width


 14.1 %


(11.5-14.5) 





 


Platelet Count


 183 x10^3/uL


(140-400) 





 


Neutrophils (%) (Auto) 80 % (31-73)  


 


Lymphocytes (%) (Auto) 12 % (24-48)  


 


Monocytes (%) (Auto) 8 % (0-9)  


 


Eosinophils (%) (Auto) 0 % (0-3)  


 


Basophils (%) (Auto) 1 % (0-3)  


 


Neutrophils # (Auto)


 4.7 x10^3/uL


(1.8-7.7) 





 


Lymphocytes # (Auto)


 0.7 x10^3/uL


(1.0-4.8) 





 


Monocytes # (Auto)


 0.5 x10^3/uL


(0.0-1.1) 





 


Eosinophils # (Auto)


 0.0 x10^3/uL


(0.0-0.7) 





 


Basophils # (Auto)


 0.0 x10^3/uL


(0.0-0.2) 





 


Sodium Level


 140 mmol/L


(136-145) 





 


Potassium Level


 3.3 mmol/L


(3.5-5.1) 





 


Chloride Level


 103 mmol/L


() 





 


Carbon Dioxide Level


 30 mmol/L


(21-32) 





 


Anion Gap 7 (6-14)  


 


Blood Urea Nitrogen


 12 mg/dL


(7-20) 





 


Creatinine


 1.1 mg/dL


(0.6-1.0) 





 


Estimated GFR


(Cockcroft-Gault) 49.8 


 





 


BUN/Creatinine Ratio 11 (6-20)  


 


Glucose Level


 153 mg/dL


(70-99) 





 


Calcium Level


 7.5 mg/dL


(8.5-10.1) 





 


Total Bilirubin


 0.8 mg/dL


(0.2-1.0) 





 


Aspartate Amino Transf


(AST/SGOT) 29 U/L (15-37) 


 





 


Alanine Aminotransferase


(ALT/SGPT) 19 U/L (14-59) 


 





 


Alkaline Phosphatase


 57 U/L


() 





 


Troponin I Quantitative


 < 0.017 ng/mL


(0.000-0.055) 





 


Total Protein


 6.7 g/dL


(6.4-8.2) 





 


Albumin


 2.7 g/dL


(3.4-5.0) 





 


Albumin/Globulin Ratio 0.7 (1.0-1.7)  


 


Influenza Type A Antigen


 Negative


(NEGATIVE) 





 


Influenza Type B Antigen


 Negative


(NEGATIVE) 





 


Lactic Acid Level


 


 1.4 mmol/L


(0.4-2.0)








Laboratory Tests








Test


 10/29/20


04:45 10/29/20


05:00


 


White Blood Count


 5.9 x10^3/uL


(4.0-11.0) 





 


Red Blood Count


 4.73 x10^6/uL


(3.50-5.40) 





 


Hemoglobin


 13.5 g/dL


(12.0-15.5) 





 


Hematocrit


 39.7 %


(36.0-47.0) 





 


Mean Corpuscular Volume 84 fL ()  


 


Mean Corpuscular Hemoglobin 29 pg (25-35)  


 


Mean Corpuscular Hemoglobin


Concent 34 g/dL


(31-37) 





 


Red Cell Distribution Width


 14.1 %


(11.5-14.5) 





 


Platelet Count


 183 x10^3/uL


(140-400) 





 


Neutrophils (%) (Auto) 80 % (31-73)  


 


Lymphocytes (%) (Auto) 12 % (24-48)  


 


Monocytes (%) (Auto) 8 % (0-9)  


 


Eosinophils (%) (Auto) 0 % (0-3)  


 


Basophils (%) (Auto) 1 % (0-3)  


 


Neutrophils # (Auto)


 4.7 x10^3/uL


(1.8-7.7) 





 


Lymphocytes # (Auto)


 0.7 x10^3/uL


(1.0-4.8) 





 


Monocytes # (Auto)


 0.5 x10^3/uL


(0.0-1.1) 





 


Eosinophils # (Auto)


 0.0 x10^3/uL


(0.0-0.7) 





 


Basophils # (Auto)


 0.0 x10^3/uL


(0.0-0.2) 





 


Sodium Level


 140 mmol/L


(136-145) 





 


Potassium Level


 3.3 mmol/L


(3.5-5.1) 





 


Chloride Level


 103 mmol/L


() 





 


Carbon Dioxide Level


 30 mmol/L


(21-32) 





 


Anion Gap 7 (6-14)  


 


Blood Urea Nitrogen


 12 mg/dL


(7-20) 





 


Creatinine


 1.1 mg/dL


(0.6-1.0) 





 


Estimated GFR


(Cockcroft-Gault) 49.8 


 





 


BUN/Creatinine Ratio 11 (6-20)  


 


Glucose Level


 153 mg/dL


(70-99) 





 


Calcium Level


 7.5 mg/dL


(8.5-10.1) 





 


Total Bilirubin


 0.8 mg/dL


(0.2-1.0) 





 


Aspartate Amino Transf


(AST/SGOT) 29 U/L (15-37) 


 





 


Alanine Aminotransferase


(ALT/SGPT) 19 U/L (14-59) 


 





 


Alkaline Phosphatase


 57 U/L


() 





 


Troponin I Quantitative


 < 0.017 ng/mL


(0.000-0.055) 





 


Total Protein


 6.7 g/dL


(6.4-8.2) 





 


Albumin


 2.7 g/dL


(3.4-5.0) 





 


Albumin/Globulin Ratio 0.7 (1.0-1.7)  


 


Influenza Type A Antigen


 Negative


(NEGATIVE) 





 


Influenza Type B Antigen


 Negative


(NEGATIVE) 





 


Lactic Acid Level


 


 1.4 mmol/L


(0.4-2.0)











Images


Images


CXR:


Single view of chest obtained.


Enlarged cardiomediastinal silhouette with calcific atherosclerosis. 


Interstitial and groundglass opacities throughout the bilateral lungs.


 


IMPRESSION:


*  Interstitial and groundglass opacities bilaterally which could be from edema 

or bilateral infiltrate.


*  Enlarged cardiomediastinal Silhouette which can be seen with cardiomegaly 

and/or pericardial effusion.





VTE Prophylaxis Ordered


VTE Prophylaxis Devices:  No


VTE Pharmacological Prophylaxi:  Yes





Assessment/Plan


Assessment/Plan


A/P:


Acute hypoxia - likely related to community acquired pneumonia vs COVID 19 vs 

acute chf. Will check procalcitonin, BNP and continue empiric rocephin + 

doxycycline. wean O2 as tolerated


Sepsis - with fever, tachycardia, likely related to pneumonia, will stop IVF 

given CHF history and cont empiric antibiotics


Shortness of breath - see above


Hypokalemia - will replace PO, check mag


Moderate protein calorie malnutrition - dietician to see


Hyperglycemia - no hx of DM2, will check a1c, sliding scale insulin


CAD s/p LAD stent x1 in  - trend troponins, EKG not concerning


H/o Diastolic CHF - will check BNP, unclear if this is acute CHF, though it does

appear so.


GERD - ppi


HLD - statin


HTN - hold coreg for low BP, will restart


Morbid obesity - counseled on weight loss, diet, exercise





FEN - Cardiac diet


PPX - lovenox


FULL CODE


Dispo - inpatient





COVID-19 CRITERIA:    The patient was evaluated during the global COVID-19 

pandemic, and that diagnosis was suspected/considered upon their initial 

presentation.  Their evaluation, treatment and testing was consistent with 

current guidelines for patients who present with complaints or symptoms that may

be related to COVID-19.











Justifications for Admission


Other Justification














ROCIO CANSECO MD        Oct 29, 2020 08:30

## 2020-10-29 NOTE — NUR
SW following for discharge planning. Spoke with RN and reviewed chart. Pt from home. Pt 
currently on 4l 02, regular diet, COVID pending. Pt on IV Rocephin. rehab screen ordered. SW 
following.

## 2020-10-29 NOTE — PHYS DOC
General Adult


EDM:


Chief Complaint:  SHORTNESS OF BREATH





HPI:


HPI:





Patient is a 65  year old female presents with a chief complaint of shortness of

breath.  Patient states shortness of breath has been ongoing since Thursday.  

She states shortness of breath is been progressively becoming worse.  She states

she has associated cough with some sputum production.  She states she has had 

fevers and chills.  Over the last several hours patient states shortness of 

breath is greatly increased.  On arrival she was tachypneic and hypoxic on room 

air with a saturations in the 80s.




















Patient states her mother recently passed away and had  on .  

She states her mother was hospitalized for prolonged period of time on the Covid

floor at [facility] but states her mother was never diagnosed with C

ovid.





Review of Systems:


Review of Systems:


Constitutional:   Positive fever or chills. []


Eyes:   Denies change in visual acuity. []


HENT:   Denies nasal congestion or sore throat. [] 


Respiratory:   Positive cough or shortness of breath. [] 


Cardiovascular:   Denies chest pain or edema. [] 


GI:   Denies abdominal pain, nausea, vomiting, bloody stools or diarrhea. [] 


:  Denies dysuria. [] 


Musculoskeletal:   Denies back pain or joint pain. [Positive myalgias] 


Integument:   Denies rash. [] 


Neurologic:   Denies headache, focal weakness or sensory changes. [] 


Endocrine:   Denies polyuria or polydipsia. [] 


Lymphatic:  Denies swollen glands. [] 


Psychiatric:  Denies depression or anxiety. []





Heart Score:


Risk Factors:


Risk Factors:  DM, Current or recent (<one month) smoker, HTN, HLP, family 

history of CAD, obesity.


Risk Scores:


Score 0 - 3:  2.5% MACE over next 6 weeks - Discharge Home


Score 4 - 6:  20.3% MACE over next 6 weeks - Admit for Clinical Observation


Score 7 - 10:  72.7% MACE over next 6 weeks - Early Invasive Strategies





Physical Exam:


PE:





Constitutional: Well developed, well nourished, no acute distress, non-toxic 

appearance. []


HENT: Normocephalic, atraumatic, bilateral external ears normal, oropharynx 

moist, no oral exudates, nose normal. []


Eyes: PERRLA, EOMI, conjunctiva normal, no discharge. [] 


Neck: Normal range of motion, no tenderness, supple, no stridor. [] 


Cardiovascular:Heart rate regular rhythm, no murmur []


Lungs & Thorax:  decreased breath sounds tachypnea


Abdomen: Bowel sounds normal, soft, no tenderness, no masses, no pulsatile 

masses. [] 


Skin: Warm, dry, no erythema, no rash. [] 


Back: No tenderness, no CVA tenderness. [] 


Extremities: No tenderness, no cyanosis, no clubbing, ROM intact, no edema. [] 


Neurologic: Alert and oriented X 3, normal motor function, normal sensory 

function, no focal deficits noted. []


Psychologic: Affect normal, judgement normal, mood normal. []





EKG:


EKG:


[] Time 0427 heart rate 87 no ST elevation no ST depression no acute MI





Radiology/Procedures:


Radiology/Procedures:


[]


Impression:


*  Interstitial and groundglass opacities bilaterally which could be from 


edema or bilateral infiltrate.


 


*  Enlarged cardiomediastinal Silhouette which can be seen with 


cardiomegaly and/or pericardial effusion.


 


Electronically signed by: Panchito Lisa MD (10/29/2020 5:45 AM) 


[id number]





Course & Med Decision Making:


Course & Med Decision Making


Pertinent Labs and Imaging studies reviewed. (See chart for details)





[]





Dragon Disclaimer:


Dragon Disclaimer:


This electronic medical record was generated, in whole or in part, using a voice

 recognition dictation system.





Departure


Departure


Impression:  


   Primary Impression:  


   Dyspnea


   Additional Impressions:  


   Hypoxia


   Person under investigation for COVID-19


Disposition:   ADMITTED AS INPT THIS HOSP


Condition:  STABLE


Referrals:  


NO PCP (PCP)











ANA MAYNARD DO            Oct 29, 2020 04:34

## 2020-10-29 NOTE — RAD
INDICATION: Reason: SOB / Spl. Instructions:  / History: 

 

COMPARISON: November 3, 2010

 

FINDINGS:

 

Single view of chest obtained.

Enlarged cardiomediastinal silhouette with calcific atherosclerosis. 

Interstitial and groundglass opacities throughout the bilateral lungs.

 

 

IMPRESSION:

 

*  Interstitial and groundglass opacities bilaterally which could be from 

edema or bilateral infiltrate.

 

*  Enlarged cardiomediastinal Silhouette which can be seen with 

cardiomegaly and/or pericardial effusion.

 

Electronically signed by: Panchito Lisa MD (10/29/2020 5:45 AM) 

[id number]

## 2020-10-29 NOTE — NUR
Patient Chelsey Pal 65 year old female, admitted due to hypoxia , PUI arrived on the unit 
at 0820 via gurney on 4 liters oxygen per nasal cannula. She's awake, alert, oriented x4, 
VSS, denies pain. She was oriented to unit policies, belongings checked, meds reviewed, and 
call light placed within reach.

## 2020-10-30 VITALS — DIASTOLIC BLOOD PRESSURE: 82 MMHG | SYSTOLIC BLOOD PRESSURE: 148 MMHG

## 2020-10-30 VITALS — SYSTOLIC BLOOD PRESSURE: 130 MMHG | DIASTOLIC BLOOD PRESSURE: 69 MMHG

## 2020-10-30 VITALS — SYSTOLIC BLOOD PRESSURE: 109 MMHG | DIASTOLIC BLOOD PRESSURE: 58 MMHG

## 2020-10-30 VITALS — DIASTOLIC BLOOD PRESSURE: 57 MMHG | SYSTOLIC BLOOD PRESSURE: 104 MMHG

## 2020-10-30 VITALS — SYSTOLIC BLOOD PRESSURE: 133 MMHG | DIASTOLIC BLOOD PRESSURE: 77 MMHG

## 2020-10-30 VITALS — DIASTOLIC BLOOD PRESSURE: 68 MMHG | SYSTOLIC BLOOD PRESSURE: 131 MMHG

## 2020-10-30 VITALS — DIASTOLIC BLOOD PRESSURE: 69 MMHG | SYSTOLIC BLOOD PRESSURE: 130 MMHG

## 2020-10-30 VITALS — DIASTOLIC BLOOD PRESSURE: 68 MMHG | SYSTOLIC BLOOD PRESSURE: 124 MMHG

## 2020-10-30 LAB
ALBUMIN SERPL-MCNC: 2.4 G/DL (ref 3.4–5)
ALP SERPL-CCNC: 58 U/L (ref 46–116)
ALT SERPL-CCNC: 30 U/L (ref 14–59)
ANION GAP SERPL CALC-SCNC: 10 MMOL/L (ref 6–14)
AST SERPL-CCNC: 48 U/L (ref 15–37)
BASOPHILS # BLD AUTO: 0 X10^3/UL (ref 0–0.2)
BASOPHILS NFR BLD: 0 % (ref 0–3)
BILIRUB DIRECT SERPL-MCNC: 0.3 MG/DL (ref 0–0.2)
BILIRUB SERPL-MCNC: 0.6 MG/DL (ref 0.2–1)
BUN SERPL-MCNC: 16 MG/DL (ref 7–20)
CALCIUM SERPL-MCNC: 8 MG/DL (ref 8.5–10.1)
CHLORIDE SERPL-SCNC: 104 MMOL/L (ref 98–107)
CO2 SERPL-SCNC: 28 MMOL/L (ref 21–32)
CREAT SERPL-MCNC: 1.1 MG/DL (ref 0.6–1)
EOSINOPHIL NFR BLD: 0 % (ref 0–3)
EOSINOPHIL NFR BLD: 0 X10^3/UL (ref 0–0.7)
ERYTHROCYTE [DISTWIDTH] IN BLOOD BY AUTOMATED COUNT: 14.2 % (ref 11.5–14.5)
GFR SERPLBLD BASED ON 1.73 SQ M-ARVRAT: 49.8 ML/MIN
GLUCOSE SERPL-MCNC: 223 MG/DL (ref 70–99)
HBA1C MFR BLD: 6.1 % (ref 4.8–5.6)
HCT VFR BLD CALC: 38.5 % (ref 36–47)
HGB BLD-MCNC: 13.4 G/DL (ref 12–15.5)
LYMPHOCYTES # BLD: 0.5 X10^3/UL (ref 1–4.8)
LYMPHOCYTES NFR BLD AUTO: 9 % (ref 24–48)
MCH RBC QN AUTO: 29 PG (ref 25–35)
MCHC RBC AUTO-ENTMCNC: 35 G/DL (ref 31–37)
MCV RBC AUTO: 84 FL (ref 79–100)
MONO #: 0.6 X10^3/UL (ref 0–1.1)
MONOCYTES NFR BLD: 9 % (ref 0–9)
NEUT #: 5.2 X10^3/UL (ref 1.8–7.7)
NEUTROPHILS NFR BLD AUTO: 82 % (ref 31–73)
PLATELET # BLD AUTO: 234 X10^3/UL (ref 140–400)
POTASSIUM SERPL-SCNC: 3.9 MMOL/L (ref 3.5–5.1)
PROT SERPL-MCNC: 6 G/DL (ref 6.4–8.2)
RBC # BLD AUTO: 4.57 X10^6/UL (ref 3.5–5.4)
SODIUM SERPL-SCNC: 142 MMOL/L (ref 136–145)
WBC # BLD AUTO: 6.3 X10^3/UL (ref 4–11)

## 2020-10-30 PROCEDURE — XW13325 TRANSFUSION OF CONVALESCENT PLASMA (NONAUTOLOGOUS) INTO PERIPHERAL VEIN, PERCUTANEOUS APPROACH, NEW TECHNOLOGY GROUP 5: ICD-10-PCS | Performed by: INTERNAL MEDICINE

## 2020-10-30 RX ADMIN — METHYLPREDNISOLONE SODIUM SUCCINATE SCH MG: 40 INJECTION, POWDER, FOR SOLUTION INTRAMUSCULAR; INTRAVENOUS at 15:11

## 2020-10-30 RX ADMIN — LOSARTAN POTASSIUM SCH MG: 50 TABLET ORAL at 18:00

## 2020-10-30 RX ADMIN — NYSTATIN SCH APP: 100000 POWDER TOPICAL at 09:00

## 2020-10-30 RX ADMIN — INSULIN LISPRO SCH UNITS: 100 INJECTION, SOLUTION INTRAVENOUS; SUBCUTANEOUS at 12:57

## 2020-10-30 RX ADMIN — ATORVASTATIN CALCIUM SCH MG: 20 TABLET, FILM COATED ORAL at 22:14

## 2020-10-30 RX ADMIN — PANTOPRAZOLE SODIUM SCH MG: 40 TABLET, DELAYED RELEASE ORAL at 09:59

## 2020-10-30 RX ADMIN — INSULIN LISPRO SCH UNITS: 100 INJECTION, SOLUTION INTRAVENOUS; SUBCUTANEOUS at 18:00

## 2020-10-30 RX ADMIN — Medication SCH CAP: at 22:14

## 2020-10-30 RX ADMIN — ENOXAPARIN SODIUM SCH MG: 100 INJECTION SUBCUTANEOUS at 09:59

## 2020-10-30 RX ADMIN — METHYLPREDNISOLONE SODIUM SUCCINATE SCH MG: 40 INJECTION, POWDER, FOR SOLUTION INTRAMUSCULAR; INTRAVENOUS at 23:57

## 2020-10-30 RX ADMIN — NYSTATIN SCH APP: 100000 POWDER TOPICAL at 22:16

## 2020-10-30 RX ADMIN — DOXYCYCLINE SCH MLS/HR: 100 INJECTION, POWDER, LYOPHILIZED, FOR SOLUTION INTRAVENOUS at 23:57

## 2020-10-30 RX ADMIN — CETIRIZINE HYDROCHLORIDE SCH MG: 10 TABLET, FILM COATED ORAL at 10:00

## 2020-10-30 RX ADMIN — ASPIRIN SCH MG: 81 TABLET, COATED ORAL at 09:59

## 2020-10-30 RX ADMIN — INSULIN LISPRO SCH UNITS: 100 INJECTION, SOLUTION INTRAVENOUS; SUBCUTANEOUS at 10:02

## 2020-10-30 RX ADMIN — CEFTRIAXONE SCH GM: 1 INJECTION, POWDER, FOR SOLUTION INTRAMUSCULAR; INTRAVENOUS at 06:17

## 2020-10-30 RX ADMIN — INSULIN LISPRO SCH UNITS: 100 INJECTION, SOLUTION INTRAVENOUS; SUBCUTANEOUS at 21:48

## 2020-10-30 RX ADMIN — ENOXAPARIN SODIUM SCH MG: 100 INJECTION SUBCUTANEOUS at 22:14

## 2020-10-30 RX ADMIN — DOXYCYCLINE SCH MLS/HR: 100 INJECTION, POWDER, LYOPHILIZED, FOR SOLUTION INTRAVENOUS at 10:58

## 2020-10-30 RX ADMIN — CARVEDILOL SCH MG: 6.25 TABLET, FILM COATED ORAL at 18:00

## 2020-10-30 RX ADMIN — CARVEDILOL SCH MG: 3.12 TABLET, FILM COATED ORAL at 08:00

## 2020-10-30 NOTE — PDOC
PULMONARY PROGRESS NOTES


DATE: 10/30/20 


TIME: 16:26


Vitals





Vital Signs








  Date Time  Temp Pulse Resp B/P (MAP) Pulse Ox O2 Delivery O2 Flow Rate FiO2


 


10/30/20 15:00 98.3 67 27 131/68 (89) 96 Nasal Cannula 4.0 





 98.3       








Labs





Laboratory Tests








Test


 10/29/20


04:30 10/29/20


04:45 10/29/20


05:00 10/29/20


20:45


 


Coronavirus (PCR)


 Detected (Not


Detected) 


 


 





 


White Blood Count


 


 5.9 x10^3/uL


(4.0-11.0) 


 





 


Red Blood Count


 


 4.73 x10^6/uL


(3.50-5.40) 


 





 


Hemoglobin


 


 13.5 g/dL


(12.0-15.5) 


 





 


Hematocrit


 


 39.7 %


(36.0-47.0) 


 





 


Mean Corpuscular Volume  84 fL ()   


 


Mean Corpuscular Hemoglobin  29 pg (25-35)   


 


Mean Corpuscular Hemoglobin


Concent 


 34 g/dL


(31-37) 


 





 


Red Cell Distribution Width


 


 14.1 %


(11.5-14.5) 


 





 


Platelet Count


 


 183 x10^3/uL


(140-400) 


 





 


Neutrophils (%) (Auto)  80 % (31-73)   


 


Lymphocytes (%) (Auto)  12 % (24-48)   


 


Monocytes (%) (Auto)  8 % (0-9)   


 


Eosinophils (%) (Auto)  0 % (0-3)   


 


Basophils (%) (Auto)  1 % (0-3)   


 


Neutrophils # (Auto)


 


 4.7 x10^3/uL


(1.8-7.7) 


 





 


Lymphocytes # (Auto)


 


 0.7 x10^3/uL


(1.0-4.8) 


 





 


Monocytes # (Auto)


 


 0.5 x10^3/uL


(0.0-1.1) 


 





 


Eosinophils # (Auto)


 


 0.0 x10^3/uL


(0.0-0.7) 


 





 


Basophils # (Auto)


 


 0.0 x10^3/uL


(0.0-0.2) 


 





 


Sodium Level


 


 140 mmol/L


(136-145) 


 





 


Potassium Level


 


 3.3 mmol/L


(3.5-5.1) 


 





 


Chloride Level


 


 103 mmol/L


() 


 





 


Carbon Dioxide Level


 


 30 mmol/L


(21-32) 


 





 


Anion Gap  7 (6-14)   


 


Blood Urea Nitrogen


 


 12 mg/dL


(7-20) 


 





 


Creatinine


 


 1.1 mg/dL


(0.6-1.0) 


 





 


Estimated GFR


(Cockcroft-Gault) 


 49.8 


 


 





 


BUN/Creatinine Ratio  11 (6-20)   


 


Glucose Level


 


 153 mg/dL


(70-99) 


 





 


Hemoglobin A1c


 


 6.1 %


(4.8-5.6) 


 





 


Calcium Level


 


 7.5 mg/dL


(8.5-10.1) 


 





 


Magnesium Level


 


 2.2 mg/dL


(1.8-2.4) 


 





 


Total Bilirubin


 


 0.8 mg/dL


(0.2-1.0) 


 





 


Aspartate Amino Transf


(AST/SGOT) 


 29 U/L (15-37) 


 


 





 


Alanine Aminotransferase


(ALT/SGPT) 


 19 U/L (14-59) 


 


 





 


Alkaline Phosphatase


 


 57 U/L


() 


 





 


Troponin I Quantitative


 


 < 0.017 ng/mL


(0.000-0.055) 


 < 0.017 ng/mL


(0.000-0.055)


 


NT-Pro-B-Type Natriuretic


Peptide 


 258 pg/mL


(0-124) 


 





 


Total Protein


 


 6.7 g/dL


(6.4-8.2) 


 





 


Albumin


 


 2.7 g/dL


(3.4-5.0) 


 





 


Albumin/Globulin Ratio  0.7 (1.0-1.7)   


 


Thyroid Stimulating Hormone


(TSH) 


 1.367 uIU/mL


(0.358-3.74) 


 





 


Influenza Type A Antigen


 


 Negative


(NEGATIVE) 


 





 


Influenza Type B Antigen


 


 Negative


(NEGATIVE) 


 





 


Lactic Acid Level


 


 


 1.4 mmol/L


(0.4-2.0) 





 


Test


 10/30/20


04:00 10/30/20


08:27 10/30/20


11:52 





 


White Blood Count


 6.3 x10^3/uL


(4.0-11.0) 


 


 





 


Red Blood Count


 4.57 x10^6/uL


(3.50-5.40) 


 


 





 


Hemoglobin


 13.4 g/dL


(12.0-15.5) 


 


 





 


Hematocrit


 38.5 %


(36.0-47.0) 


 


 





 


Mean Corpuscular Volume 84 fL ()    


 


Mean Corpuscular Hemoglobin 29 pg (25-35)    


 


Mean Corpuscular Hemoglobin


Concent 35 g/dL


(31-37) 


 


 





 


Red Cell Distribution Width


 14.2 %


(11.5-14.5) 


 


 





 


Platelet Count


 234 x10^3/uL


(140-400) 


 


 





 


Neutrophils (%) (Auto) 82 % (31-73)    


 


Lymphocytes (%) (Auto) 9 % (24-48)    


 


Monocytes (%) (Auto) 9 % (0-9)    


 


Eosinophils (%) (Auto) 0 % (0-3)    


 


Basophils (%) (Auto) 0 % (0-3)    


 


Neutrophils # (Auto)


 5.2 x10^3/uL


(1.8-7.7) 


 


 





 


Lymphocytes # (Auto)


 0.5 x10^3/uL


(1.0-4.8) 


 


 





 


Monocytes # (Auto)


 0.6 x10^3/uL


(0.0-1.1) 


 


 





 


Eosinophils # (Auto)


 0.0 x10^3/uL


(0.0-0.7) 


 


 





 


Basophils # (Auto)


 0.0 x10^3/uL


(0.0-0.2) 


 


 





 


D-Dimer (Willa)


 0.66 ug/mlFEU


(0.00-0.50) 


 


 





 


Sodium Level


 142 mmol/L


(136-145) 


 


 





 


Potassium Level


 3.9 mmol/L


(3.5-5.1) 


 


 





 


Chloride Level


 104 mmol/L


() 


 


 





 


Carbon Dioxide Level


 28 mmol/L


(21-32) 


 


 





 


Anion Gap 10 (6-14)    


 


Blood Urea Nitrogen


 16 mg/dL


(7-20) 


 


 





 


Creatinine


 1.1 mg/dL


(0.6-1.0) 


 


 





 


Estimated GFR


(Cockcroft-Gault) 49.8 


 


 


 





 


Glucose Level


 223 mg/dL


(70-99) 


 


 





 


Calcium Level


 8.0 mg/dL


(8.5-10.1) 


 


 





 


Total Bilirubin


 0.6 mg/dL


(0.2-1.0) 


 


 





 


Direct Bilirubin


 0.3 mg/dL


(0.0-0.2) 


 


 





 


Aspartate Amino Transf


(AST/SGOT) 48 U/L (15-37) 


 


 


 





 


Alanine Aminotransferase


(ALT/SGPT) 30 U/L (14-59) 


 


 


 





 


Alkaline Phosphatase


 58 U/L


() 


 


 





 


Troponin I Quantitative


 < 0.017 ng/mL


(0.000-0.055) 


 


 





 


Total Protein


 6.0 g/dL


(6.4-8.2) 


 


 





 


Albumin


 2.4 g/dL


(3.4-5.0) 


 


 





 


Glucose (Fingerstick)


 


 181 mg/dL


(70-99) 199 mg/dL


(70-99) 











Laboratory Tests








Test


 10/29/20


20:45 10/30/20


04:00 10/30/20


08:27 10/30/20


11:52


 


Troponin I Quantitative


 < 0.017 ng/mL


(0.000-0.055) < 0.017 ng/mL


(0.000-0.055) 


 





 


White Blood Count


 


 6.3 x10^3/uL


(4.0-11.0) 


 





 


Red Blood Count


 


 4.57 x10^6/uL


(3.50-5.40) 


 





 


Hemoglobin


 


 13.4 g/dL


(12.0-15.5) 


 





 


Hematocrit


 


 38.5 %


(36.0-47.0) 


 





 


Mean Corpuscular Volume  84 fL ()   


 


Mean Corpuscular Hemoglobin  29 pg (25-35)   


 


Mean Corpuscular Hemoglobin


Concent 


 35 g/dL


(31-37) 


 





 


Red Cell Distribution Width


 


 14.2 %


(11.5-14.5) 


 





 


Platelet Count


 


 234 x10^3/uL


(140-400) 


 





 


Neutrophils (%) (Auto)  82 % (31-73)   


 


Lymphocytes (%) (Auto)  9 % (24-48)   


 


Monocytes (%) (Auto)  9 % (0-9)   


 


Eosinophils (%) (Auto)  0 % (0-3)   


 


Basophils (%) (Auto)  0 % (0-3)   


 


Neutrophils # (Auto)


 


 5.2 x10^3/uL


(1.8-7.7) 


 





 


Lymphocytes # (Auto)


 


 0.5 x10^3/uL


(1.0-4.8) 


 





 


Monocytes # (Auto)


 


 0.6 x10^3/uL


(0.0-1.1) 


 





 


Eosinophils # (Auto)


 


 0.0 x10^3/uL


(0.0-0.7) 


 





 


Basophils # (Auto)


 


 0.0 x10^3/uL


(0.0-0.2) 


 





 


D-Dimer (Willa)


 


 0.66 ug/mlFEU


(0.00-0.50) 


 





 


Sodium Level


 


 142 mmol/L


(136-145) 


 





 


Potassium Level


 


 3.9 mmol/L


(3.5-5.1) 


 





 


Chloride Level


 


 104 mmol/L


() 


 





 


Carbon Dioxide Level


 


 28 mmol/L


(21-32) 


 





 


Anion Gap  10 (6-14)   


 


Blood Urea Nitrogen


 


 16 mg/dL


(7-20) 


 





 


Creatinine


 


 1.1 mg/dL


(0.6-1.0) 


 





 


Estimated GFR


(Cockcroft-Gault) 


 49.8 


 


 





 


Glucose Level


 


 223 mg/dL


(70-99) 


 





 


Calcium Level


 


 8.0 mg/dL


(8.5-10.1) 


 





 


Total Bilirubin


 


 0.6 mg/dL


(0.2-1.0) 


 





 


Direct Bilirubin


 


 0.3 mg/dL


(0.0-0.2) 


 





 


Aspartate Amino Transf


(AST/SGOT) 


 48 U/L (15-37) 


 


 





 


Alanine Aminotransferase


(ALT/SGPT) 


 30 U/L (14-59) 


 


 





 


Alkaline Phosphatase


 


 58 U/L


() 


 





 


Total Protein


 


 6.0 g/dL


(6.4-8.2) 


 





 


Albumin


 


 2.4 g/dL


(3.4-5.0) 


 





 


Glucose (Fingerstick)


 


 


 181 mg/dL


(70-99) 199 mg/dL


(70-99)








Medications





Active Scripts








 Medications  Dose


 Route/Sig


 Max Daily Dose Days Date Category


 


 Cinnamon


  (Cinnamon Bark)


 500 Mg Capsule  500 Mg


 PO DAILY


   10/29/20 Reported


 


 Crestor


  (Rosuvastatin


 Calcium) 5 Mg


 Tablet  5 Mg


 PO HS


   10/29/20 Reported


 


 Losartan


 Potassium 50 Mg


 Tablet  50 Mg


 PO DAILYWSUP


   10/29/20 Reported


 


 Spironolactone 25


 Mg Tablet  1 Tab


 PO DAILY


   10/29/20 Reported


 


 Protonix  **


  (Pantoprazole


 Sodium) 40 Mg


 Tablet.dr  40 Mg


 PO DAILYAC


   10/29/20 Reported


 


 Carvedilol **


  (Carvedilol) 6.25


 Mg Tablet  6.25 Mg


 PO DAILYWSUP


   10/29/20 Reported


 


 Carvedilol **


  (Carvedilol)


 3.125 Mg Tablet  3.125 Mg


 PO DAILY08


   10/29/20 Reported


 


 Claritin


  (Loratadine) 10


 Mg Capsule  1 Cap


 PO DAILY


  30 10/29/20 Reported


 


 Aspirin Ec


  (Aspirin) 81 Mg


 Tablet.  1 Tab


 PO DAILY


   10/29/20 Reported











Impression


.


Full consult dictated


COVID-19 viral pneumonia


Acute hypoxemic respiratory failure


Discussed with AIMEE Dennis MD              Oct 30, 2020 16:26

## 2020-10-30 NOTE — CONS
DATE OF CONSULTATION:  10/30/2020



ATTENDING PHYSICIAN:  Dr. Aurelio Riley



REASON FOR CONSULTATION:  The patient is seen in pulmonary consultation at the

request of Dr. Riley for [id number] positivity, increasing oxygen demands and

abnormal x-ray compatible with COVID-19.



HISTORY OF PRESENT ILLNESS:  The patient is a 65-year-old who has a prior

history of coronary artery disease with previous stent placement, chronic

diastolic heart failure, gastroesophageal reflux, hypertension and

hyperlipidemia along with morbid obesity.  She became progressively short of

breath over the last week or so.  The patient states that her mother passed away

on 10/06.  They had a  and 50% of the crowd was not wearing a face mask. 

The patient presented with the above symptoms.  She has been symptomatic now for

some time.  Upon arrival, she was tachypneic, room air saturation was 80%.  She

had a fever of 100.6.  I reviewed her x-ray, which revealed bilateral pulmonary

infiltrates compatible with atypical viral pneumonia with CHF.



The patient is currently requiring oxygen supplementation at 4 liters.



She denies any productive cough.  She used to smoke, quit approximately 10 years

ago.



PAST MEDICAL HISTORY:  Morbid obesity, coronary artery disease with previous

stent placement, gastroesophageal reflux, hypertension, hyperlipidemia, possible

COPD, tobacco dependence, in remission.



PAST SURGICAL HISTORY:  Status post coronary artery stenting.



SOCIAL HISTORY:  She quit tobacco 10 years ago.  Denies any alcohol intake.



REVIEW OF SYSTEMS:

CONSTITUTIONAL:  Subjective fever.

EYES:  No change in visual acuity.

HENT:  No nasal congestion or sore throat.

PULMONARY:  As indicated above.

CARDIOVASCULAR:  No chest pain.  No pressure.

GASTROINTESTINAL:  No nausea, vomiting, diarrhea.

GENITOURINARY:  No dysuria or frequency.

MUSCULOSKELETAL:  No localized muscle aches or joint pain.

SKIN:  No new skin rashes.

NEUROLOGIC:  No headaches, diplopia or blurred vision.



CURRENT MEDICATIONS:  List was reviewed, the patient was started on steroids.  I

discussed the case with Dr. Riley.  She will be given convalescent serum.  She

is also on doxycycline.



PHYSICAL EXAMINATION:

GENERAL:  The patient was seen during COVID-19 pandemic.  Due to her positivity,

visual examination revealed that she was in no significant respiratory distress.

 No accessory muscles.

ABDOMEN:  Not distended.

EXTREMITIES:  There was no significant edema.

NEUROLOGIC:  The patient was awake, alert, following commands.



LABORATORY DATA:  Reviewed.  White count was normal.  She had a lymphopenia. 

INR was 0.66.  Electrolytes were noted.  BUN and creatinine were normal.  AST

was elevated.  Albumin was low.  Glucose was elevated.



Chest x-ray, bilateral pulmonary infiltrates.



IMPRESSION:

1.  Acute hypoxemic respiratory failure secondary to COVID-19.

2.  COVID-19 viral pneumonia.

3.  Possible bacterial pneumonia.

4.  Fever secondary to above.

5.  Chronic diastolic heart failure.

6.  Severe protein malnutrition present upon admission.

7.  Coronary artery disease with previous stent placement in .

8.  Hyperlipidemia.

9.  Morbid obesity.

10.  Tobacco dependence, in remission.

11.  Chronic obstructive pulmonary disease.



PLAN:

1.  As indicated above, the case was discussed with Dr. Riley.  Will proceed

with convalescent serum.

2.  Continue steroids.

3.  Continue empiric antibiotics.

4.  DVT prophylaxis with Lovenox 60 q.12 hours.

5.  Monitor for further deterioration of symptoms.



JUSTIFICATION FOR ADMISSION:  The patient presents with an acutely impaired

vital organ system with high probability of life threatening deterioration

requiring high complexity decision making to assess and titrate different

medications to prevent life-threatening deterioration.



I do appreciate the privilege in sharing in the patient's care.

 



______________________________

AIMEE MCLEAN MD DR:  BONILLA/joyce  JOB#:  [id number] / [id number]

DD:  10/30/2020 16:26  DT:  10/30/2020 19:25

## 2020-10-30 NOTE — PDOC
[facility] [facility] PROGRESS NOTE


Date of Service


DOS:


DATE: 10/30/20 


TIME: 08:00





Chief Complaint


Chief Complaint


A/P:


Acute hypoxia - likely related to community acquired pneumonia vs COVID 19 vs 

acute chf. Will check procalcitonin, BNP and continue empiric rocephin + 

doxycycline. wean O2 as tolerated


Sepsis - with fever, tachycardia, likely related to pneumonia, will stop IVF 

given CHF history and cont empiric antibiotics


COVID 19 - solumedrol, type and screen for convalescent FFP, thromboprophylaxis


Shortness of breath - see above


Hypokalemia - will replace PO, check mag


Moderate protein calorie malnutrition - dietician to see


Hyperglycemia - no hx of DM2, will check a1c, sliding scale insulin


CAD s/p LAD stent x1 in  - trend troponins, EKG not concerning


H/o Diastolic CHF - will check BNP, unclear if this is acute CHF, though it does

appear so.


GERD - ppi


HLD - statin


HTN - hold coreg for low BP, will restart


Morbid obesity - counseled on weight loss, diet, exercise





FEN - Cardiac diet


PPX - lovenox


FULL CODE


Dispo - inpatient





History of Present Illness


History of Present Illness


Ms Pal is a 66 yo F w/ PMHx CAD s/p LAD stent x1 in , diastolic CHF, GERD, 

HLD, HTN, morbid obesity who p/w progressive shortness of breath.  Patient 

states shortness of breath has been ongoing since Thursday 10/22/2020.  She 

states shortness of breath is been progressively becoming worse.  She states she

has associated cough with some sputum production. No significant orthopnea or 

PND or LE edema. She states she has had fevers and chills.  Over the last 

several hours patient states shortness of breath is greatly increased.  On 

arrival she was tachypneic and hypoxic on room air with a saturations in the 80s

requiring O2 administration and febrile to 100.6 F.





Patient states her mother recently passed away and had  on .  

She states her mother was hospitalized for prolonged period of time on the Covid

floor at [facility] but states her mother was never diagnosed with 

Covid.  The  was indoors and many people including visitors from 

[place] refused to wear masks.





EKG with heart rate 87 no ST elevation no ST depression no acute MI


CXR with bilateral interstitial opacities, cardiomegaly and coronary 

calcifications


WBC 5.9, Hb 13.5, platelets 183, , K3.3, BUN 12, CR 1.1, glucose 153, 

lactate 1.4, albumin 2.7, troponin 0.


Admitted for further care





Having diarrhea today. Achy, short of breath. Afebrile. Advised to start 

steroids and FFP for COVID 19.





Vitals/I&O


Vitals/I&O:





                                   Vital Signs








  Date Time  Temp Pulse Resp B/P (MAP) Pulse Ox O2 Delivery O2 Flow Rate FiO2


 


10/30/20 06:42 97.7 71 18 104/57 (73) 94 Nasal Cannula 4.0 





 97.7       














                                    I & O   


 


 10/29/20 10/29/20 10/30/20





 15:00 23:00 07:00


 


Intake Total   200 ml


 


Balance   200 ml











Physical Exam


General:  Alert, Oriented X3, Cooperative, mild distress


Abdomen:  Normal bowel sounds, Soft, No tenderness, No hepatosplenomegaly, No 

masses


Extremities:  No clubbing, No cyanosis, No edema, Normal pulses, No 

tenderness/swelling


Skin:  No rashes, No breakdown, No significant lesion





Labs


Labs:





Laboratory Tests








Test


 10/29/20


20:45 10/30/20


04:00


 


Troponin I Quantitative


 < 0.017 ng/mL


(0.000-0.055) < 0.017 ng/mL


(0.000-0.055)


 


White Blood Count


 


 6.3 x10^3/uL


(4.0-11.0)


 


Red Blood Count


 


 4.57 x10^6/uL


(3.50-5.40)


 


Hemoglobin


 


 13.4 g/dL


(12.0-15.5)


 


Hematocrit


 


 38.5 %


(36.0-47.0)


 


Mean Corpuscular Volume  84 fL () 


 


Mean Corpuscular Hemoglobin  29 pg (25-35) 


 


Mean Corpuscular Hemoglobin


Concent 


 35 g/dL


(31-37)


 


Red Cell Distribution Width


 


 14.2 %


(11.5-14.5)


 


Platelet Count


 


 234 x10^3/uL


(140-400)


 


Neutrophils (%) (Auto)  82 % (31-73) 


 


Lymphocytes (%) (Auto)  9 % (24-48) 


 


Monocytes (%) (Auto)  9 % (0-9) 


 


Eosinophils (%) (Auto)  0 % (0-3) 


 


Basophils (%) (Auto)  0 % (0-3) 


 


Neutrophils # (Auto)


 


 5.2 x10^3/uL


(1.8-7.7)


 


Lymphocytes # (Auto)


 


 0.5 x10^3/uL


(1.0-4.8)


 


Monocytes # (Auto)


 


 0.6 x10^3/uL


(0.0-1.1)


 


Eosinophils # (Auto)


 


 0.0 x10^3/uL


(0.0-0.7)


 


Basophils # (Auto)


 


 0.0 x10^3/uL


(0.0-0.2)


 


D-Dimer (Willa)


 


 0.66 ug/mlFEU


(0.00-0.50)


 


Sodium Level


 


 142 mmol/L


(136-145)


 


Potassium Level


 


 3.9 mmol/L


(3.5-5.1)


 


Chloride Level


 


 104 mmol/L


()


 


Carbon Dioxide Level


 


 28 mmol/L


(21-32)


 


Anion Gap  10 (6-14) 


 


Blood Urea Nitrogen


 


 16 mg/dL


(7-20)


 


Creatinine


 


 1.1 mg/dL


(0.6-1.0)


 


Estimated GFR


(Cockcroft-Gault) 


 49.8 





 


Glucose Level


 


 223 mg/dL


(70-99)


 


Calcium Level


 


 8.0 mg/dL


(8.5-10.1)


 


Total Bilirubin


 


 0.6 mg/dL


(0.2-1.0)


 


Direct Bilirubin


 


 0.3 mg/dL


(0.0-0.2)


 


Aspartate Amino Transf


(AST/SGOT) 


 48 U/L (15-37) 





 


Alanine Aminotransferase


(ALT/SGPT) 


 30 U/L (14-59) 





 


Alkaline Phosphatase


 


 58 U/L


()


 


Total Protein


 


 6.0 g/dL


(6.4-8.2)


 


Albumin


 


 2.4 g/dL


(3.4-5.0)











Assessment and Plan


Assessmemt and Plan


Problems


Medical Problems:


(1) CHF (congestive heart failure)


Status: Acute  





(2) Dyspnea


Status: Acute  





(3) Hypoxia


Status: Acute  





(4) Person under investigation for COVID-19


Status: Acute  











Comment


Review of Relevant


I have reviewed the following items heather (where applicable) has been applied.


Medications:





Current Medications








 Medications


  (Trade)  Dose


 Ordered  Sig/Sharon


 Route


 PRN Reason  Start Time


 Stop Time Status Last Admin


Dose Admin


 


 Ondansetron HCl


  (Zofran)  4 mg  PRN Q4HRS  PRN


 IV


 NAUSEA/VOMITING  10/29/20 08:30


    10/29/20 21:40





 


 Potassium Chloride


  (Klor-Con)  40 meq  1X  ONCE


 PO


   10/29/20 08:30


 10/29/20 08:39 DC 10/29/20 10:16





 


 Enoxaparin Sodium


  (Lovenox 60mg


 Syringe)  60 mg  Q12HR


 SQ


   10/29/20 09:00


    10/29/20 21:32





 


 Doxycycline


 Hyclate 100 mg/


 Dextrose  100 ml @ 


 50 mls/hr  Q12HR


 IV


   10/29/20 17:00


    10/29/20 17:12





 


 Ceftriaxone Sodium


  (Rocephin)  1 gm  Q24H


 IVP


   10/30/20 06:00


    10/30/20 06:17





 


 Carvedilol


  (Coreg)  6.25 mg  DAILYWSUP


 PO


   10/29/20 17:00


    10/29/20 17:13





 


 Losartan Potassium


  (Cozaar)  50 mg  DAILYWSUP


 PO


   10/29/20 17:00


    10/29/20 17:13





 


 Pantoprazole


 Sodium


  (Protonix)  40 mg  DAILYAC


 PO


   10/29/20 16:30


    10/29/20 17:14





 


 Atorvastatin


 Calcium


  (Lipitor)  20 mg  QHS


 PO


   10/29/20 21:00


    10/29/20 21:31





 


 Nystatin


  (Nystop)  1 reagan  BID


 TP


   10/29/20 21:00


    10/29/20 21:31














Justifications for Admission


Other Justification














ROCIO CANSECO MD        Oct 30, 2020 08:00

## 2020-10-30 NOTE — NUR
SW following for discharge planning. Spoke with RN and reviewed chart. Pt from home with 
spouse. Pt works from home for [employer]. Pt uses a walker at home. PT/OT to 
evaluate. Pt remains on 4l 02, IV abx, and a regular diet. Pt does not have home 02. Pt's 
COVID result came back positive. Spoke with spouse Shabbir ([phone]) to coordinate 
care. SW following.

## 2020-10-31 VITALS — SYSTOLIC BLOOD PRESSURE: 132 MMHG | DIASTOLIC BLOOD PRESSURE: 80 MMHG

## 2020-10-31 VITALS — SYSTOLIC BLOOD PRESSURE: 129 MMHG | DIASTOLIC BLOOD PRESSURE: 70 MMHG

## 2020-10-31 VITALS — SYSTOLIC BLOOD PRESSURE: 135 MMHG | DIASTOLIC BLOOD PRESSURE: 70 MMHG

## 2020-10-31 VITALS — SYSTOLIC BLOOD PRESSURE: 129 MMHG | DIASTOLIC BLOOD PRESSURE: 74 MMHG

## 2020-10-31 VITALS — SYSTOLIC BLOOD PRESSURE: 125 MMHG | DIASTOLIC BLOOD PRESSURE: 59 MMHG

## 2020-10-31 VITALS — SYSTOLIC BLOOD PRESSURE: 125 MMHG | DIASTOLIC BLOOD PRESSURE: 63 MMHG

## 2020-10-31 LAB
ALBUMIN SERPL-MCNC: 2.5 G/DL (ref 3.4–5)
ALBUMIN/GLOB SERPL: 0.6 {RATIO} (ref 1–1.7)
ALP SERPL-CCNC: 54 U/L (ref 46–116)
ALT SERPL-CCNC: 74 U/L (ref 14–59)
ANION GAP SERPL CALC-SCNC: 10 MMOL/L (ref 6–14)
AST SERPL-CCNC: 98 U/L (ref 15–37)
BILIRUB SERPL-MCNC: 0.6 MG/DL (ref 0.2–1)
BUN SERPL-MCNC: 17 MG/DL (ref 7–20)
BUN/CREAT SERPL: 17 (ref 6–20)
CALCIUM SERPL-MCNC: 7.8 MG/DL (ref 8.5–10.1)
CHLORIDE SERPL-SCNC: 105 MMOL/L (ref 98–107)
CO2 SERPL-SCNC: 29 MMOL/L (ref 21–32)
CREAT SERPL-MCNC: 1 MG/DL (ref 0.6–1)
GFR SERPLBLD BASED ON 1.73 SQ M-ARVRAT: 55.6 ML/MIN
GLUCOSE SERPL-MCNC: 202 MG/DL (ref 70–99)
POTASSIUM SERPL-SCNC: 3.9 MMOL/L (ref 3.5–5.1)
PROT SERPL-MCNC: 6.5 G/DL (ref 6.4–8.2)
SODIUM SERPL-SCNC: 144 MMOL/L (ref 136–145)

## 2020-10-31 RX ADMIN — IPRATROPIUM BROMIDE AND ALBUTEROL SCH PUFF: 20; 100 SPRAY, METERED RESPIRATORY (INHALATION) at 10:00

## 2020-10-31 RX ADMIN — LOPERAMIDE HYDROCHLORIDE PRN MG: 2 CAPSULE ORAL at 21:27

## 2020-10-31 RX ADMIN — METHYLPREDNISOLONE SODIUM SUCCINATE SCH MG: 40 INJECTION, POWDER, FOR SOLUTION INTRAMUSCULAR; INTRAVENOUS at 13:36

## 2020-10-31 RX ADMIN — METHYLPREDNISOLONE SODIUM SUCCINATE SCH MG: 40 INJECTION, POWDER, FOR SOLUTION INTRAMUSCULAR; INTRAVENOUS at 06:08

## 2020-10-31 RX ADMIN — ACETAMINOPHEN PRN MG: 325 TABLET, FILM COATED ORAL at 21:27

## 2020-10-31 RX ADMIN — CARVEDILOL SCH MG: 3.12 TABLET, FILM COATED ORAL at 09:52

## 2020-10-31 RX ADMIN — Medication SCH CAP: at 21:15

## 2020-10-31 RX ADMIN — CETIRIZINE HYDROCHLORIDE SCH MG: 10 TABLET, FILM COATED ORAL at 09:49

## 2020-10-31 RX ADMIN — PANTOPRAZOLE SODIUM SCH MG: 40 TABLET, DELAYED RELEASE ORAL at 09:49

## 2020-10-31 RX ADMIN — ASPIRIN SCH MG: 81 TABLET, COATED ORAL at 09:48

## 2020-10-31 RX ADMIN — ACETAMINOPHEN PRN MG: 325 TABLET, FILM COATED ORAL at 03:07

## 2020-10-31 RX ADMIN — NYSTATIN SCH APP: 100000 POWDER TOPICAL at 09:00

## 2020-10-31 RX ADMIN — IPRATROPIUM BROMIDE AND ALBUTEROL SCH PUFF: 20; 100 SPRAY, METERED RESPIRATORY (INHALATION) at 16:34

## 2020-10-31 RX ADMIN — CARVEDILOL SCH MG: 6.25 TABLET, FILM COATED ORAL at 16:34

## 2020-10-31 RX ADMIN — METHYLPREDNISOLONE SODIUM SUCCINATE SCH MG: 40 INJECTION, POWDER, FOR SOLUTION INTRAMUSCULAR; INTRAVENOUS at 22:00

## 2020-10-31 RX ADMIN — ALBUTEROL SULFATE PRN PUFF: 90 AEROSOL, METERED RESPIRATORY (INHALATION) at 21:25

## 2020-10-31 RX ADMIN — INSULIN LISPRO SCH UNITS: 100 INJECTION, SOLUTION INTRAVENOUS; SUBCUTANEOUS at 16:35

## 2020-10-31 RX ADMIN — ENOXAPARIN SODIUM SCH MG: 100 INJECTION SUBCUTANEOUS at 21:16

## 2020-10-31 RX ADMIN — ALBUTEROL SULFATE PRN PUFF: 90 AEROSOL, METERED RESPIRATORY (INHALATION) at 03:23

## 2020-10-31 RX ADMIN — LOSARTAN POTASSIUM SCH MG: 50 TABLET ORAL at 16:34

## 2020-10-31 RX ADMIN — NYSTATIN SCH APP: 100000 POWDER TOPICAL at 21:25

## 2020-10-31 RX ADMIN — Medication SCH CAP: at 09:49

## 2020-10-31 RX ADMIN — DOXYCYCLINE SCH MLS/HR: 100 INJECTION, POWDER, LYOPHILIZED, FOR SOLUTION INTRAVENOUS at 21:16

## 2020-10-31 RX ADMIN — INSULIN LISPRO SCH UNITS: 100 INJECTION, SOLUTION INTRAVENOUS; SUBCUTANEOUS at 10:08

## 2020-10-31 RX ADMIN — ATORVASTATIN CALCIUM SCH MG: 20 TABLET, FILM COATED ORAL at 21:15

## 2020-10-31 RX ADMIN — LOPERAMIDE HYDROCHLORIDE PRN MG: 2 CAPSULE ORAL at 03:07

## 2020-10-31 RX ADMIN — IPRATROPIUM BROMIDE AND ALBUTEROL SCH PUFF: 20; 100 SPRAY, METERED RESPIRATORY (INHALATION) at 21:27

## 2020-10-31 RX ADMIN — CEFTRIAXONE SCH GM: 1 INJECTION, POWDER, FOR SOLUTION INTRAMUSCULAR; INTRAVENOUS at 06:08

## 2020-10-31 RX ADMIN — INSULIN LISPRO SCH UNITS: 100 INJECTION, SOLUTION INTRAVENOUS; SUBCUTANEOUS at 12:08

## 2020-10-31 RX ADMIN — INSULIN LISPRO SCH UNITS: 100 INJECTION, SOLUTION INTRAVENOUS; SUBCUTANEOUS at 23:04

## 2020-10-31 RX ADMIN — IPRATROPIUM BROMIDE AND ALBUTEROL SCH PUFF: 20; 100 SPRAY, METERED RESPIRATORY (INHALATION) at 12:07

## 2020-10-31 RX ADMIN — DOXYCYCLINE SCH MLS/HR: 100 INJECTION, POWDER, LYOPHILIZED, FOR SOLUTION INTRAVENOUS at 10:10

## 2020-10-31 RX ADMIN — ENOXAPARIN SODIUM SCH MG: 100 INJECTION SUBCUTANEOUS at 09:48

## 2020-10-31 NOTE — PDOC
[employer] PROGRESS NOTE


Date of Service


DOS:


DATE: 10/31/20 


TIME: 09:19





Chief Complaint


Chief Complaint


A/P:


Acute hypoxia - likely related to community acquired pneumonia vs COVID 19 vs 

acute chf. Will check procalcitonin, BNP and continue empiric rocephin + 

doxycycline. wean O2 as tolerated


Sepsis - with fever, tachycardia, likely related to pneumonia, will stop IVF 

given CHF history and cont empiric antibiotics


COVID 19 - solumedrol, type and screen for convalescent FFP, thromboprophylaxis


Shortness of breath - see above


Hypokalemia - will replace PO, check mag


Moderate protein calorie malnutrition - dietician to see


Hyperglycemia - no hx of DM2, will check a1c, sliding scale insulin


CAD s/p LAD stent x1 in  - trend troponins, EKG not concerning


H/o Diastolic CHF - will check BNP, unclear if this is acute CHF, though it does

appear so.


GERD - ppi


HLD - statin


HTN - hold coreg for low BP, will restart


Morbid obesity - counseled on weight loss, diet, exercise





FEN - Cardiac diet


PPX - lovenox


FULL CODE


Dispo - inpatient





History of Present Illness


History of Present Illness


Ms Pal is a 64 yo F w/ PMHx CAD s/p LAD stent x1 in , diastolic CHF, GERD, 

HLD, HTN, morbid obesity who p/w progressive shortness of breath.  Patient 

states shortness of breath has been ongoing since Thursday 10/22/2020.  She 

states shortness of breath is been progressively becoming worse.  She states she

has associated cough with some sputum production. No significant orthopnea or 

PND or LE edema. She states she has had fevers and chills.  Over the last 

several hours patient states shortness of breath is greatly increased.  On 

arrival she was tachypneic and hypoxic on room air with a saturations in the 80s

requiring O2 administration and febrile to 100.6 F.





Patient states her mother recently passed away and had  on .  

She states her mother was hospitalized for prolonged period of time on the Covid

floor at [facility] but states her mother was never diagnosed with 

Covid.  The  was indoors and many people including visitors from 

[place] refused to wear masks.





EKG with heart rate 87 no ST elevation no ST depression no acute MI


CXR with bilateral interstitial opacities, cardiomegaly and coronary 

calcifications


WBC 5.9, Hb 13.5, platelets 183, , K3.3, BUN 12, CR 1.1, glucose 153, 

lactate 1.4, albumin 2.7, troponin 0.


Admitted for further care





10/30: Having diarrhea today. Achy, short of breath. Afebrile. Advised to start 

steroids and FFP for COVID 19. 





Afebrile 96% on 5 L.  Achy short of breath diarrhea improved.  Glucose little 

higher on steroids.  Status post FFP.  Feeling may be a little better.  Not a 

candidate for remdesivir based on symptoms greater than 8 days.





Vitals/I&O


Vitals/I&O:





                                   Vital Signs








  Date Time  Temp Pulse Resp B/P (MAP) Pulse Ox O2 Delivery O2 Flow Rate FiO2


 


10/31/20 09:11     92 Nasal Cannula 5.0 


 


10/31/20 02:10 96.8 70 22 135/70 (91)    





 96.8       














                                    I & O   


 


 10/30/20 10/30/20 10/31/20





 15:00 23:00 07:00


 


Output Total   1 ml


 


Balance   -1 ml











Physical Exam


General:  Alert, Oriented X3, Cooperative, mild distress


Abdomen:  Normal bowel sounds, Soft, No tenderness, No hepatosplenomegaly, No 

masses


Extremities:  No clubbing, No cyanosis, No edema, Normal pulses, No 

tenderness/swelling


Skin:  No rashes, No breakdown, No significant lesion





Labs


Labs:





Laboratory Tests








Test


 10/30/20


11:52 10/30/20


17:32 10/30/20


20:41 10/31/20


03:30


 


Glucose (Fingerstick)


 199 mg/dL


(70-99) 154 mg/dL


(70-99) 192 mg/dL


(70-99) 





 


D-Dimer (Willa)


 


 


 


 0.62 ug/mlFEU


(0.00-0.50)


 


Sodium Level


 


 


 


 144 mmol/L


(136-145)


 


Potassium Level


 


 


 


 3.9 mmol/L


(3.5-5.1)


 


Chloride Level


 


 


 


 105 mmol/L


()


 


Carbon Dioxide Level


 


 


 


 29 mmol/L


(21-32)


 


Anion Gap    10 (6-14) 


 


Blood Urea Nitrogen


 


 


 


 17 mg/dL


(7-20)


 


Creatinine


 


 


 


 1.0 mg/dL


(0.6-1.0)


 


Estimated GFR


(Cockcroft-Gault) 


 


 


 55.6 





 


BUN/Creatinine Ratio    17 (6-20) 


 


Glucose Level


 


 


 


 202 mg/dL


(70-99)


 


Calcium Level


 


 


 


 7.8 mg/dL


(8.5-10.1)


 


Total Bilirubin


 


 


 


 0.6 mg/dL


(0.2-1.0)


 


Aspartate Amino Transf


(AST/SGOT) 


 


 


 98 U/L (15-37) 





 


Alanine Aminotransferase


(ALT/SGPT) 


 


 


 74 U/L (14-59) 





 


Alkaline Phosphatase


 


 


 


 54 U/L


()


 


Total Protein


 


 


 


 6.5 g/dL


(6.4-8.2)


 


Albumin


 


 


 


 2.5 g/dL


(3.4-5.0)


 


Albumin/Globulin Ratio    0.6 (1.0-1.7) 


 


Test


 10/31/20


08:12 


 


 





 


Glucose (Fingerstick)


 204 mg/dL


(70-99) 


 


 














Assessment and Plan


Assessmemt and Plan


Problems


Medical Problems:


(1) CHF (congestive heart failure)


Status: Acute  





(2) Dyspnea


Status: Acute  





(3) Hypoxia


Status: Acute  





(4) Person under investigation for COVID-19


Status: Acute  











Comment


Review of Relevant


I have reviewed the following items heather (where applicable) has been applied.


Medications:





Current Medications








 Medications


  (Trade)  Dose


 Ordered  Sig/Sharon


 Route


 PRN Reason  Start Time


 Stop Time Status Last Admin


Dose Admin


 


 Lactobacillus


 Rhamnosus


  (Culturelle)  1 cap  BID


 PO


   10/30/20 21:00


    10/30/20 22:14





 


 Methylprednisolone


 Sodium Succinate


  (SOLU-Medrol


 40MG VIAL)  40 mg  Q8HRS


 IV


   10/30/20 14:30


    10/31/20 06:08





 


 Acetaminophen


  (Tylenol)  650 mg  PRN Q6HRS  PRN


 PO


 MILD PAIN / TEMP > 100.3'F  10/31/20 03:00


    10/31/20 03:07





 


 Lorazepam


  (Ativan)  1 mg  PRN Q4HRS  PRN


 PO


 ANXIETY / AGITATION  10/31/20 03:00


    10/31/20 03:07





 


 Loperamide HCl


  (Imodium)  2 mg  PRN Q15MIN  PRN


 PO


 DIARRHEA  10/31/20 03:00


    10/31/20 03:07





 


 Albuterol Sulfate


  (Ventolin Hfa)  1 puff  PRN Q4HRS  PRN


 INH


 SHORTNESS OF BREATH  10/31/20 03:00


    10/31/20 03:23














Justifications for Admission


Other Justification














ROCIO CANSECO MD        Oct 31, 2020 09:20

## 2020-10-31 NOTE — PDOC
PULMONARY PROGRESS NOTES


DATE: 10/31/20 


TIME: 08:41


Subjective


on 02 4 lpm, sob better   has occ cough  on home 02  she thinks 4lpm


Vitals





Vital Signs








  Date Time  Temp Pulse Resp B/P (MAP) Pulse Ox O2 Delivery O2 Flow Rate FiO2


 


10/31/20 02:10 96.8 70 22 135/70 (91) 94 Nasal Cannula 4.0 





 96.8       








Comments


alert


NC AT 


RRR


no accessory muscle  use


obese


no rash


Labs





Laboratory Tests








Test


 10/29/20


20:45 10/30/20


04:00 10/30/20


08:27 10/30/20


11:52


 


Troponin I Quantitative


 < 0.017 ng/mL


(0.000-0.055) < 0.017 ng/mL


(0.000-0.055) 


 





 


White Blood Count


 


 6.3 x10^3/uL


(4.0-11.0) 


 





 


Red Blood Count


 


 4.57 x10^6/uL


(3.50-5.40) 


 





 


Hemoglobin


 


 13.4 g/dL


(12.0-15.5) 


 





 


Hematocrit


 


 38.5 %


(36.0-47.0) 


 





 


Mean Corpuscular Volume  84 fL ()   


 


Mean Corpuscular Hemoglobin  29 pg (25-35)   


 


Mean Corpuscular Hemoglobin


Concent 


 35 g/dL


(31-37) 


 





 


Red Cell Distribution Width


 


 14.2 %


(11.5-14.5) 


 





 


Platelet Count


 


 234 x10^3/uL


(140-400) 


 





 


Neutrophils (%) (Auto)  82 % (31-73)   


 


Lymphocytes (%) (Auto)  9 % (24-48)   


 


Monocytes (%) (Auto)  9 % (0-9)   


 


Eosinophils (%) (Auto)  0 % (0-3)   


 


Basophils (%) (Auto)  0 % (0-3)   


 


Neutrophils # (Auto)


 


 5.2 x10^3/uL


(1.8-7.7) 


 





 


Lymphocytes # (Auto)


 


 0.5 x10^3/uL


(1.0-4.8) 


 





 


Monocytes # (Auto)


 


 0.6 x10^3/uL


(0.0-1.1) 


 





 


Eosinophils # (Auto)


 


 0.0 x10^3/uL


(0.0-0.7) 


 





 


Basophils # (Auto)


 


 0.0 x10^3/uL


(0.0-0.2) 


 





 


D-Dimer (Willa)


 


 0.66 ug/mlFEU


(0.00-0.50) 


 





 


Sodium Level


 


 142 mmol/L


(136-145) 


 





 


Potassium Level


 


 3.9 mmol/L


(3.5-5.1) 


 





 


Chloride Level


 


 104 mmol/L


() 


 





 


Carbon Dioxide Level


 


 28 mmol/L


(21-32) 


 





 


Anion Gap  10 (6-14)   


 


Blood Urea Nitrogen


 


 16 mg/dL


(7-20) 


 





 


Creatinine


 


 1.1 mg/dL


(0.6-1.0) 


 





 


Estimated GFR


(Cockcroft-Gault) 


 49.8 


 


 





 


Glucose Level


 


 223 mg/dL


(70-99) 


 





 


Calcium Level


 


 8.0 mg/dL


(8.5-10.1) 


 





 


Total Bilirubin


 


 0.6 mg/dL


(0.2-1.0) 


 





 


Direct Bilirubin


 


 0.3 mg/dL


(0.0-0.2) 


 





 


Aspartate Amino Transf


(AST/SGOT) 


 48 U/L (15-37) 


 


 





 


Alanine Aminotransferase


(ALT/SGPT) 


 30 U/L (14-59) 


 


 





 


Alkaline Phosphatase


 


 58 U/L


() 


 





 


Total Protein


 


 6.0 g/dL


(6.4-8.2) 


 





 


Albumin


 


 2.4 g/dL


(3.4-5.0) 


 





 


Glucose (Fingerstick)


 


 


 181 mg/dL


(70-99) 199 mg/dL


(70-99)


 


Test


 10/30/20


17:32 10/30/20


20:41 10/31/20


03:30 10/31/20


08:12


 


Glucose (Fingerstick)


 154 mg/dL


(70-99) 192 mg/dL


(70-99) 


 204 mg/dL


(70-99)


 


D-Dimer (Willa)


 


 


 0.62 ug/mlFEU


(0.00-0.50) 





 


Sodium Level


 


 


 144 mmol/L


(136-145) 





 


Potassium Level


 


 


 3.9 mmol/L


(3.5-5.1) 





 


Chloride Level


 


 


 105 mmol/L


() 





 


Carbon Dioxide Level


 


 


 29 mmol/L


(21-32) 





 


Anion Gap   10 (6-14)  


 


Blood Urea Nitrogen


 


 


 17 mg/dL


(7-20) 





 


Creatinine


 


 


 1.0 mg/dL


(0.6-1.0) 





 


Estimated GFR


(Cockcroft-Gault) 


 


 55.6 


 





 


BUN/Creatinine Ratio   17 (6-20)  


 


Glucose Level


 


 


 202 mg/dL


(70-99) 





 


Calcium Level


 


 


 7.8 mg/dL


(8.5-10.1) 





 


Total Bilirubin


 


 


 0.6 mg/dL


(0.2-1.0) 





 


Aspartate Amino Transf


(AST/SGOT) 


 


 98 U/L (15-37) 


 





 


Alanine Aminotransferase


(ALT/SGPT) 


 


 74 U/L (14-59) 


 





 


Alkaline Phosphatase


 


 


 54 U/L


() 





 


Total Protein


 


 


 6.5 g/dL


(6.4-8.2) 





 


Albumin


 


 


 2.5 g/dL


(3.4-5.0) 





 


Albumin/Globulin Ratio   0.6 (1.0-1.7)  








Laboratory Tests








Test


 10/30/20


11:52 10/30/20


17:32 10/30/20


20:41 10/31/20


03:30


 


Glucose (Fingerstick)


 199 mg/dL


(70-99) 154 mg/dL


(70-99) 192 mg/dL


(70-99) 





 


D-Dimer (Willa)


 


 


 


 0.62 ug/mlFEU


(0.00-0.50)


 


Sodium Level


 


 


 


 144 mmol/L


(136-145)


 


Potassium Level


 


 


 


 3.9 mmol/L


(3.5-5.1)


 


Chloride Level


 


 


 


 105 mmol/L


()


 


Carbon Dioxide Level


 


 


 


 29 mmol/L


(21-32)


 


Anion Gap    10 (6-14) 


 


Blood Urea Nitrogen


 


 


 


 17 mg/dL


(7-20)


 


Creatinine


 


 


 


 1.0 mg/dL


(0.6-1.0)


 


Estimated GFR


(Cockcroft-Gault) 


 


 


 55.6 





 


BUN/Creatinine Ratio    17 (6-20) 


 


Glucose Level


 


 


 


 202 mg/dL


(70-99)


 


Calcium Level


 


 


 


 7.8 mg/dL


(8.5-10.1)


 


Total Bilirubin


 


 


 


 0.6 mg/dL


(0.2-1.0)


 


Aspartate Amino Transf


(AST/SGOT) 


 


 


 98 U/L (15-37) 





 


Alanine Aminotransferase


(ALT/SGPT) 


 


 


 74 U/L (14-59) 





 


Alkaline Phosphatase


 


 


 


 54 U/L


()


 


Total Protein


 


 


 


 6.5 g/dL


(6.4-8.2)


 


Albumin


 


 


 


 2.5 g/dL


(3.4-5.0)


 


Albumin/Globulin Ratio    0.6 (1.0-1.7) 


 


Test


 10/31/20


08:12 


 


 





 


Glucose (Fingerstick)


 204 mg/dL


(70-99) 


 


 











Medications





Active Scripts








 Medications  Dose


 Route/Sig


 Max Daily Dose Days Date Category


 


 Cinnamon


  (Cinnamon Bark)


 500 Mg Capsule  500 Mg


 PO DAILY


   10/29/20 Reported


 


 Crestor


  (Rosuvastatin


 Calcium) 5 Mg


 Tablet  5 Mg


 PO HS


   10/29/20 Reported


 


 Losartan


 Potassium 50 Mg


 Tablet  50 Mg


 PO DAILYWSUP


   10/29/20 Reported


 


 Spironolactone 25


 Mg Tablet  1 Tab


 PO DAILY


   10/29/20 Reported


 


 Protonix  **


  (Pantoprazole


 Sodium) 40 Mg


 Tablet.  40 Mg


 PO DAILYAC


   10/29/20 Reported


 


 Carvedilol **


  (Carvedilol) 6.25


 Mg Tablet  6.25 Mg


 PO DAILYWSUP


   10/29/20 Reported


 


 Carvedilol **


  (Carvedilol)


 3.125 Mg Tablet  3.125 Mg


 PO DAILY08


   10/29/20 Reported


 


 Claritin


  (Loratadine) 10


 Mg Capsule  1 Cap


 PO DAILY


  30 10/29/20 Reported


 


 Aspirin Ec


  (Aspirin) 81 Mg


 Tablet.  1 Tab


 PO DAILY


   10/29/20 Reported











Impression


.


IMPRESSION:


1.  Acute hypoxemic respiratory failure secondary to COVID-19.


2.  COVID-19 viral pneumonia.


3.  Possible bacterial pneumonia.


4.  Fever secondary to above.


5.  Chronic diastolic heart failure.


6.  Severe protein malnutrition present upon admission.


7.  Coronary artery disease with previous stent placement in 2010.


8.  Hyperlipidemia.


9.  Morbid obesity.


10.  Tobacco dependence, in remission.


11.  Chronic obstructive pulmonary disease.





Plan


.


PLAN:


1.  s/p convalescent serum.  02 titration 


2.  Continue steroids.


3.  Continue empiric antibiotics.


4.  DVT prophylaxis with Lovenox 60 q.12 hours.


5.  Monitor for further deterioration of symptoms.


6. psg out pt  when stable if not done


discussed w LEEANNA Jean MD               Oct 31, 2020 08:42

## 2020-11-01 VITALS — DIASTOLIC BLOOD PRESSURE: 59 MMHG | SYSTOLIC BLOOD PRESSURE: 113 MMHG

## 2020-11-01 VITALS — DIASTOLIC BLOOD PRESSURE: 70 MMHG | SYSTOLIC BLOOD PRESSURE: 135 MMHG

## 2020-11-01 VITALS — SYSTOLIC BLOOD PRESSURE: 145 MMHG | DIASTOLIC BLOOD PRESSURE: 84 MMHG

## 2020-11-01 VITALS — SYSTOLIC BLOOD PRESSURE: 140 MMHG | DIASTOLIC BLOOD PRESSURE: 79 MMHG

## 2020-11-01 VITALS — SYSTOLIC BLOOD PRESSURE: 141 MMHG | DIASTOLIC BLOOD PRESSURE: 79 MMHG

## 2020-11-01 VITALS — SYSTOLIC BLOOD PRESSURE: 134 MMHG | DIASTOLIC BLOOD PRESSURE: 71 MMHG

## 2020-11-01 LAB
ALBUMIN SERPL-MCNC: 2.3 G/DL (ref 3.4–5)
ALBUMIN/GLOB SERPL: 0.6 {RATIO} (ref 1–1.7)
ALP SERPL-CCNC: 53 U/L (ref 46–116)
ALT SERPL-CCNC: 91 U/L (ref 14–59)
ANION GAP SERPL CALC-SCNC: 9 MMOL/L (ref 6–14)
AST SERPL-CCNC: 70 U/L (ref 15–37)
BILIRUB SERPL-MCNC: 0.4 MG/DL (ref 0.2–1)
BUN SERPL-MCNC: 20 MG/DL (ref 7–20)
BUN/CREAT SERPL: 20 (ref 6–20)
CALCIUM SERPL-MCNC: 7.8 MG/DL (ref 8.5–10.1)
CHLORIDE SERPL-SCNC: 108 MMOL/L (ref 98–107)
CO2 SERPL-SCNC: 28 MMOL/L (ref 21–32)
CREAT SERPL-MCNC: 1 MG/DL (ref 0.6–1)
GFR SERPLBLD BASED ON 1.73 SQ M-ARVRAT: 55.6 ML/MIN
GLUCOSE SERPL-MCNC: 199 MG/DL (ref 70–99)
POTASSIUM SERPL-SCNC: 4.5 MMOL/L (ref 3.5–5.1)
PROT SERPL-MCNC: 6.2 G/DL (ref 6.4–8.2)
SODIUM SERPL-SCNC: 145 MMOL/L (ref 136–145)

## 2020-11-01 PROCEDURE — 02HV33Z INSERTION OF INFUSION DEVICE INTO SUPERIOR VENA CAVA, PERCUTANEOUS APPROACH: ICD-10-PCS | Performed by: INTERNAL MEDICINE

## 2020-11-01 RX ADMIN — INSULIN LISPRO SCH UNITS: 100 INJECTION, SOLUTION INTRAVENOUS; SUBCUTANEOUS at 14:35

## 2020-11-01 RX ADMIN — IPRATROPIUM BROMIDE AND ALBUTEROL SCH PUFF: 20; 100 SPRAY, METERED RESPIRATORY (INHALATION) at 12:00

## 2020-11-01 RX ADMIN — DOXYCYCLINE SCH MLS/HR: 100 INJECTION, POWDER, LYOPHILIZED, FOR SOLUTION INTRAVENOUS at 11:07

## 2020-11-01 RX ADMIN — CETIRIZINE HYDROCHLORIDE SCH MG: 10 TABLET, FILM COATED ORAL at 08:52

## 2020-11-01 RX ADMIN — PANTOPRAZOLE SODIUM SCH MG: 40 TABLET, DELAYED RELEASE ORAL at 08:52

## 2020-11-01 RX ADMIN — DOXYCYCLINE SCH MLS/HR: 100 INJECTION, POWDER, LYOPHILIZED, FOR SOLUTION INTRAVENOUS at 21:28

## 2020-11-01 RX ADMIN — LOSARTAN POTASSIUM SCH MG: 50 TABLET ORAL at 17:54

## 2020-11-01 RX ADMIN — NYSTATIN SCH APP: 100000 POWDER TOPICAL at 21:40

## 2020-11-01 RX ADMIN — INSULIN LISPRO SCH UNITS: 100 INJECTION, SOLUTION INTRAVENOUS; SUBCUTANEOUS at 21:40

## 2020-11-01 RX ADMIN — PSYLLIUM HUSK SCH PKT: 3.4 POWDER ORAL at 16:00

## 2020-11-01 RX ADMIN — ATORVASTATIN CALCIUM SCH MG: 20 TABLET, FILM COATED ORAL at 20:29

## 2020-11-01 RX ADMIN — IPRATROPIUM BROMIDE AND ALBUTEROL SCH PUFF: 20; 100 SPRAY, METERED RESPIRATORY (INHALATION) at 08:00

## 2020-11-01 RX ADMIN — NYSTATIN SCH APP: 100000 POWDER TOPICAL at 09:00

## 2020-11-01 RX ADMIN — IPRATROPIUM BROMIDE AND ALBUTEROL SCH PUFF: 20; 100 SPRAY, METERED RESPIRATORY (INHALATION) at 16:00

## 2020-11-01 RX ADMIN — ASPIRIN SCH MG: 81 TABLET, COATED ORAL at 08:52

## 2020-11-01 RX ADMIN — Medication SCH CAP: at 08:52

## 2020-11-01 RX ADMIN — METHYLPREDNISOLONE SODIUM SUCCINATE SCH MG: 40 INJECTION, POWDER, FOR SOLUTION INTRAMUSCULAR; INTRAVENOUS at 06:00

## 2020-11-01 RX ADMIN — ENOXAPARIN SODIUM SCH MG: 100 INJECTION SUBCUTANEOUS at 20:29

## 2020-11-01 RX ADMIN — ENOXAPARIN SODIUM SCH MG: 100 INJECTION SUBCUTANEOUS at 08:51

## 2020-11-01 RX ADMIN — METHYLPREDNISOLONE SODIUM SUCCINATE SCH MG: 40 INJECTION, POWDER, FOR SOLUTION INTRAMUSCULAR; INTRAVENOUS at 20:29

## 2020-11-01 RX ADMIN — Medication SCH CAP: at 20:29

## 2020-11-01 RX ADMIN — CARVEDILOL SCH MG: 3.12 TABLET, FILM COATED ORAL at 08:55

## 2020-11-01 RX ADMIN — INSULIN LISPRO SCH UNITS: 100 INJECTION, SOLUTION INTRAVENOUS; SUBCUTANEOUS at 08:57

## 2020-11-01 RX ADMIN — IPRATROPIUM BROMIDE AND ALBUTEROL SCH PUFF: 20; 100 SPRAY, METERED RESPIRATORY (INHALATION) at 21:39

## 2020-11-01 RX ADMIN — INSULIN LISPRO SCH UNITS: 100 INJECTION, SOLUTION INTRAVENOUS; SUBCUTANEOUS at 17:58

## 2020-11-01 RX ADMIN — CARVEDILOL SCH MG: 6.25 TABLET, FILM COATED ORAL at 08:52

## 2020-11-01 RX ADMIN — CEFTRIAXONE SCH GM: 1 INJECTION, POWDER, FOR SOLUTION INTRAMUSCULAR; INTRAVENOUS at 06:00

## 2020-11-01 NOTE — PDOC
[facility] [facility] PROGRESS NOTE


Date of Service


DOS:


DATE: 20 


TIME: 08:14





Chief Complaint


Chief Complaint


A/P:


Acute hypoxia - likely related to community acquired pneumonia vs COVID 19 vs 

acute chf. Will check procalcitonin, BNP and continue empiric rocephin + 

doxycycline. wean O2 as tolerated


Sepsis - with fever, tachycardia, likely related to pneumonia, will stop IVF 

given CHF history and cont empiric antibiotics


COVID 19 - solumedrol, s/p convalescent FFP, thromboprophylaxis, remdesivir not 

indicated given her symptoms >8 days on presentation


Shortness of breath - see above


Hypokalemia - will replace PO, check mag


Moderate protein calorie malnutrition - dietician to see


Hyperglycemia - no hx of DM2, will check a1c, sliding scale insulin


CAD s/p LAD stent x1 in  - trend troponins, EKG not concerning


H/o Diastolic CHF - will check BNP, unclear if this is acute CHF, though it does

appear so.


GERD - ppi


HLD - statin


HTN - hold coreg for low BP, will restart


Morbid obesity - counseled on weight loss, diet, exercise





FEN - Cardiac diet


PPX - lovenox


FULL CODE


Dispo - inpatient





History of Present Illness


History of Present Illness


Ms Pal is a 66 yo F w/ PMHx CAD s/p LAD stent x1 in , diastolic CHF, GERD, 

HLD, HTN, morbid obesity who p/w progressive shortness of breath.  Patient 

states shortness of breath has been ongoing since Thursday 10/22/2020.  She 

states shortness of breath is been progressively becoming worse.  She states she

has associated cough with some sputum production. No significant orthopnea or 

PND or LE edema. She states she has had fevers and chills.  Over the last 

several hours patient states shortness of breath is greatly increased.  On 

arrival she was tachypneic and hypoxic on room air with a saturations in the 80s

requiring O2 administration and febrile to 100.6 F.





Patient states her mother recently passed away and had  on .  

She states her mother was hospitalized for prolonged period of time on the Covid

floor at [facility] but states her mother was never diagnosed with 

Covid.  The  was indoors and many people including visitors from 

[place] refused to wear masks.





EKG with heart rate 87 no ST elevation no ST depression no acute MI


CXR with bilateral interstitial opacities, cardiomegaly and coronary 

calcifications


WBC 5.9, Hb 13.5, platelets 183, , K3.3, BUN 12, CR 1.1, glucose 153, 

lactate 1.4, albumin 2.7, troponin 0.


Admitted for further care





10/30: Having diarrhea today. Achy, short of breath. Afebrile. Advised to start 

steroids and FFP for COVID 19. 


10/31: Afebrile 96% on 5 L.  Achy short of breath diarrhea improved.  Glucose 

little higher on steroids.  Status post FFP.  Feeling may be a little better.  

Not a candidate for remdesivir based on symptoms greater than 8 days.





Afebrile, D-dimer normalized, LFTs improving.  Stable 96% on 5 L nasal cannula 

oxygen. Having PICC placed today given frequent loss of IV access.. Still with 

diarrhea





Vitals/I&O


Vitals/I&O:





                                   Vital Signs








  Date Time  Temp Pulse Resp B/P (MAP) Pulse Ox O2 Delivery O2 Flow Rate FiO2


 


20 03:00 96.7 62 19 145/84 (104) 95 Nasal Cannula 5.0 





 96.7       














                                    I & O   


 


 10/31/20 10/31/20 11/1/20





 15:00 23:00 07:00


 


Intake Total   240 ml


 


Balance   240 ml











Physical Exam


General:  Alert, Oriented X3, Cooperative, mild distress


Abdomen:  Normal bowel sounds, Soft, No tenderness, No hepatosplenomegaly, No 

masses


Extremities:  No clubbing, No cyanosis, No edema, Normal pulses, No 

tenderness/swelling


Skin:  No rashes, No breakdown, No significant lesion





Labs


Labs:





Laboratory Tests








Test


 10/31/20


11:51 10/31/20


15:44 10/31/20


22:28 20


04:00


 


Glucose (Fingerstick)


 224 mg/dL


(70-99) 247 mg/dL


(70-99) 275 mg/dL


(70-99) 





 


D-Dimer (Willa)


 


 


 


 0.28 ug/mlFEU


(0.00-0.50)


 


Sodium Level


 


 


 


 145 mmol/L


(136-145)


 


Potassium Level


 


 


 


 4.5 mmol/L


(3.5-5.1)


 


Chloride Level


 


 


 


 108 mmol/L


()


 


Carbon Dioxide Level


 


 


 


 28 mmol/L


(21-32)


 


Anion Gap    9 (6-14) 


 


Blood Urea Nitrogen


 


 


 


 20 mg/dL


(7-20)


 


Creatinine


 


 


 


 1.0 mg/dL


(0.6-1.0)


 


Estimated GFR


(Cockcroft-Gault) 


 


 


 55.6 





 


BUN/Creatinine Ratio    20 (6-20) 


 


Glucose Level


 


 


 


 199 mg/dL


(70-99)


 


Calcium Level


 


 


 


 7.8 mg/dL


(8.5-10.1)


 


Total Bilirubin


 


 


 


 0.4 mg/dL


(0.2-1.0)


 


Aspartate Amino Transf


(AST/SGOT) 


 


 


 70 U/L (15-37) 





 


Alanine Aminotransferase


(ALT/SGPT) 


 


 


 91 U/L (14-59) 





 


Alkaline Phosphatase


 


 


 


 53 U/L


()


 


Total Protein


 


 


 


 6.2 g/dL


(6.4-8.2)


 


Albumin


 


 


 


 2.3 g/dL


(3.4-5.0)


 


Albumin/Globulin Ratio    0.6 (1.0-1.7) 


 


Test


 20


07:39 


 


 





 


Glucose (Fingerstick)


 219 mg/dL


(70-99) 


 


 














Assessment and Plan


Assessmemt and Plan


Problems


Medical Problems:


(1) CHF (congestive heart failure)


Status: Acute  





(2) Dyspnea


Status: Acute  





(3) Hypoxia


Status: Acute  





(4) Person under investigation for COVID-19


Status: Acute  











Comment


Review of Relevant


I have reviewed the following items heather (where applicable) has been applied.





Justifications for Admission


Other Justification














ROCIO CANSECO MD         2020 08:16

## 2020-11-01 NOTE — RAD
EXAM: Chest, single view; chest, single view.

 

HISTORY: PICC line placement.

 

COMPARISON: 10/29/2020

 

FINDINGS: Frontal views of the chest are obtained. The initial image 

demonstrates a right PICC with the tip in the right atrium. The second 

image demonstrates slight retraction of the catheter with the tip 

overlying the superior cavoatrial junction. There is stable diffuse 

infiltrate and there are stable suspected small pleural effusions. There 

is a stable prominent cardiac silhouette.

 

IMPRESSION: 

1. Interval positioning and repositioning of a right PICC with the tip at 

the superior cavoatrial junction on the final image.

2. Stable diffuse infiltrate and small pleural effusions.

 

Electronically signed by: Alysia Elizabeth MD (11/1/2020 10:55 AM) [id number]

## 2020-11-01 NOTE — PDOC
PULMONARY PROGRESS NOTES


DATE: 11/1/20 


TIME: 09:10


Subjective


on 02 4 lpm, sob better   has occ cough  on home 02  she thinks 2-3 lpm


Vitals





Vital Signs








  Date Time  Temp Pulse Resp B/P (MAP) Pulse Ox O2 Delivery O2 Flow Rate FiO2


 


11/1/20 08:55  63  141/79    


 


11/1/20 07:00 95.5  19  93 Nasal Cannula 5.0 





 95.5       








Comments


on 02


alert


NC AT 


RRR


no accessory muscle  use


obese


no rash


Labs





Laboratory Tests








Test


 10/30/20


11:52 10/30/20


17:32 10/30/20


20:41 10/31/20


03:30


 


Glucose (Fingerstick)


 199 mg/dL


(70-99) 154 mg/dL


(70-99) 192 mg/dL


(70-99) 





 


D-Dimer (Willa)


 


 


 


 0.62 ug/mlFEU


(0.00-0.50)


 


Sodium Level


 


 


 


 144 mmol/L


(136-145)


 


Potassium Level


 


 


 


 3.9 mmol/L


(3.5-5.1)


 


Chloride Level


 


 


 


 105 mmol/L


()


 


Carbon Dioxide Level


 


 


 


 29 mmol/L


(21-32)


 


Anion Gap    10 (6-14) 


 


Blood Urea Nitrogen


 


 


 


 17 mg/dL


(7-20)


 


Creatinine


 


 


 


 1.0 mg/dL


(0.6-1.0)


 


Estimated GFR


(Cockcroft-Gault) 


 


 


 55.6 





 


BUN/Creatinine Ratio    17 (6-20) 


 


Glucose Level


 


 


 


 202 mg/dL


(70-99)


 


Calcium Level


 


 


 


 7.8 mg/dL


(8.5-10.1)


 


Total Bilirubin


 


 


 


 0.6 mg/dL


(0.2-1.0)


 


Aspartate Amino Transf


(AST/SGOT) 


 


 


 98 U/L (15-37) 





 


Alanine Aminotransferase


(ALT/SGPT) 


 


 


 74 U/L (14-59) 





 


Alkaline Phosphatase


 


 


 


 54 U/L


()


 


Total Protein


 


 


 


 6.5 g/dL


(6.4-8.2)


 


Albumin


 


 


 


 2.5 g/dL


(3.4-5.0)


 


Albumin/Globulin Ratio    0.6 (1.0-1.7) 


 


Test


 10/31/20


08:12 10/31/20


11:51 10/31/20


15:44 10/31/20


22:28


 


Glucose (Fingerstick)


 204 mg/dL


(70-99) 224 mg/dL


(70-99) 247 mg/dL


(70-99) 275 mg/dL


(70-99)


 


Test


 11/1/20


04:00 11/1/20


07:39 


 





 


D-Dimer (Willa)


 0.28 ug/mlFEU


(0.00-0.50) 


 


 





 


Sodium Level


 145 mmol/L


(136-145) 


 


 





 


Potassium Level


 4.5 mmol/L


(3.5-5.1) 


 


 





 


Chloride Level


 108 mmol/L


() 


 


 





 


Carbon Dioxide Level


 28 mmol/L


(21-32) 


 


 





 


Anion Gap 9 (6-14)    


 


Blood Urea Nitrogen


 20 mg/dL


(7-20) 


 


 





 


Creatinine


 1.0 mg/dL


(0.6-1.0) 


 


 





 


Estimated GFR


(Cockcroft-Gault) 55.6 


 


 


 





 


BUN/Creatinine Ratio 20 (6-20)    


 


Glucose Level


 199 mg/dL


(70-99) 


 


 





 


Calcium Level


 7.8 mg/dL


(8.5-10.1) 


 


 





 


Total Bilirubin


 0.4 mg/dL


(0.2-1.0) 


 


 





 


Aspartate Amino Transf


(AST/SGOT) 70 U/L (15-37) 


 


 


 





 


Alanine Aminotransferase


(ALT/SGPT) 91 U/L (14-59) 


 


 


 





 


Alkaline Phosphatase


 53 U/L


() 


 


 





 


Total Protein


 6.2 g/dL


(6.4-8.2) 


 


 





 


Albumin


 2.3 g/dL


(3.4-5.0) 


 


 





 


Albumin/Globulin Ratio 0.6 (1.0-1.7)    


 


Glucose (Fingerstick)


 


 219 mg/dL


(70-99) 


 











Laboratory Tests








Test


 10/31/20


11:51 10/31/20


15:44 10/31/20


22:28 11/1/20


04:00


 


Glucose (Fingerstick)


 224 mg/dL


(70-99) 247 mg/dL


(70-99) 275 mg/dL


(70-99) 





 


D-Dimer (Willa)


 


 


 


 0.28 ug/mlFEU


(0.00-0.50)


 


Sodium Level


 


 


 


 145 mmol/L


(136-145)


 


Potassium Level


 


 


 


 4.5 mmol/L


(3.5-5.1)


 


Chloride Level


 


 


 


 108 mmol/L


()


 


Carbon Dioxide Level


 


 


 


 28 mmol/L


(21-32)


 


Anion Gap    9 (6-14) 


 


Blood Urea Nitrogen


 


 


 


 20 mg/dL


(7-20)


 


Creatinine


 


 


 


 1.0 mg/dL


(0.6-1.0)


 


Estimated GFR


(Cockcroft-Gault) 


 


 


 55.6 





 


BUN/Creatinine Ratio    20 (6-20) 


 


Glucose Level


 


 


 


 199 mg/dL


(70-99)


 


Calcium Level


 


 


 


 7.8 mg/dL


(8.5-10.1)


 


Total Bilirubin


 


 


 


 0.4 mg/dL


(0.2-1.0)


 


Aspartate Amino Transf


(AST/SGOT) 


 


 


 70 U/L (15-37) 





 


Alanine Aminotransferase


(ALT/SGPT) 


 


 


 91 U/L (14-59) 





 


Alkaline Phosphatase


 


 


 


 53 U/L


()


 


Total Protein


 


 


 


 6.2 g/dL


(6.4-8.2)


 


Albumin


 


 


 


 2.3 g/dL


(3.4-5.0)


 


Albumin/Globulin Ratio    0.6 (1.0-1.7) 


 


Test


 11/1/20


07:39 


 


 





 


Glucose (Fingerstick)


 219 mg/dL


(70-99) 


 


 











Medications





Active Scripts








 Medications  Dose


 Route/Sig


 Max Daily Dose Days Date Category


 


 Cinnamon


  (Cinnamon Bark)


 500 Mg Capsule  500 Mg


 PO DAILY


   10/29/20 Reported


 


 Crestor


  (Rosuvastatin


 Calcium) 5 Mg


 Tablet  5 Mg


 PO HS


   10/29/20 Reported


 


 Losartan


 Potassium 50 Mg


 Tablet  50 Mg


 PO DAILYWSUP


   10/29/20 Reported


 


 Spironolactone 25


 Mg Tablet  1 Tab


 PO DAILY


   10/29/20 Reported


 


 Protonix  **


  (Pantoprazole


 Sodium) 40 Mg


 Tablet.  40 Mg


 PO DAILYAC


   10/29/20 Reported


 


 Carvedilol **


  (Carvedilol) 6.25


 Mg Tablet  6.25 Mg


 PO DAILYWSUP


   10/29/20 Reported


 


 Carvedilol **


  (Carvedilol)


 3.125 Mg Tablet  3.125 Mg


 PO DAILY08


   10/29/20 Reported


 


 Claritin


  (Loratadine) 10


 Mg Capsule  1 Cap


 PO DAILY


  30 10/29/20 Reported


 


 Aspirin Ec


  (Aspirin) 81 Mg


 Tablet.  1 Tab


 PO DAILY


   10/29/20 Reported











Impression


.


IMPRESSION:


1.  Acute hypoxemic respiratory failure secondary to COVID-19.


2.  COVID-19 viral pneumonia.


3.  Possible bacterial pneumonia.


4.  Fever secondary to above.


5.  Chronic diastolic heart failure.


6.  Severe protein malnutrition present upon admission.


7.  Coronary artery disease with previous stent placement in 2010.


8.  Hyperlipidemia.


9.  Morbid obesity.


10.  Tobacco dependence, in remission.


11.  Chronic obstructive pulmonary disease.





Plan


.


PLAN:


1.  s/p convalescent serum.  02 titration 


2.  Continue steroids.


3.  Continue empiric antibiotics.


4.  DVT prophylaxis with Lovenox 60 q.12 hours.


5.  Monitor for further deterioration of symptoms.


6. psg out pt  when stable if not done


discussed w LEEANNA Jean MD                Nov 1, 2020 09:10

## 2020-11-02 VITALS — SYSTOLIC BLOOD PRESSURE: 144 MMHG | DIASTOLIC BLOOD PRESSURE: 74 MMHG

## 2020-11-02 VITALS — DIASTOLIC BLOOD PRESSURE: 87 MMHG | SYSTOLIC BLOOD PRESSURE: 150 MMHG

## 2020-11-02 VITALS — SYSTOLIC BLOOD PRESSURE: 143 MMHG | DIASTOLIC BLOOD PRESSURE: 45 MMHG

## 2020-11-02 VITALS — DIASTOLIC BLOOD PRESSURE: 79 MMHG | SYSTOLIC BLOOD PRESSURE: 143 MMHG

## 2020-11-02 VITALS — SYSTOLIC BLOOD PRESSURE: 146 MMHG | DIASTOLIC BLOOD PRESSURE: 75 MMHG

## 2020-11-02 VITALS — DIASTOLIC BLOOD PRESSURE: 67 MMHG | SYSTOLIC BLOOD PRESSURE: 129 MMHG

## 2020-11-02 LAB
ALBUMIN SERPL-MCNC: 2.4 G/DL (ref 3.4–5)
ALBUMIN/GLOB SERPL: 0.6 {RATIO} (ref 1–1.7)
ALP SERPL-CCNC: 53 U/L (ref 46–116)
ALT SERPL-CCNC: 92 U/L (ref 14–59)
ANION GAP SERPL CALC-SCNC: 9 MMOL/L (ref 6–14)
AST SERPL-CCNC: 50 U/L (ref 15–37)
BILIRUB SERPL-MCNC: 0.5 MG/DL (ref 0.2–1)
BUN SERPL-MCNC: 23 MG/DL (ref 7–20)
BUN/CREAT SERPL: 23 (ref 6–20)
CALCIUM SERPL-MCNC: 7.9 MG/DL (ref 8.5–10.1)
CHLORIDE SERPL-SCNC: 106 MMOL/L (ref 98–107)
CO2 SERPL-SCNC: 29 MMOL/L (ref 21–32)
CREAT SERPL-MCNC: 1 MG/DL (ref 0.6–1)
GFR SERPLBLD BASED ON 1.73 SQ M-ARVRAT: 55.6 ML/MIN
GLUCOSE SERPL-MCNC: 211 MG/DL (ref 70–99)
POTASSIUM SERPL-SCNC: 4.4 MMOL/L (ref 3.5–5.1)
PROT SERPL-MCNC: 6.3 G/DL (ref 6.4–8.2)
SODIUM SERPL-SCNC: 144 MMOL/L (ref 136–145)

## 2020-11-02 RX ADMIN — PSYLLIUM HUSK SCH PKT: 3.4 POWDER ORAL at 16:00

## 2020-11-02 RX ADMIN — PANTOPRAZOLE SODIUM SCH MG: 40 TABLET, DELAYED RELEASE ORAL at 09:16

## 2020-11-02 RX ADMIN — INSULIN LISPRO SCH UNITS: 100 INJECTION, SOLUTION INTRAVENOUS; SUBCUTANEOUS at 12:14

## 2020-11-02 RX ADMIN — IPRATROPIUM BROMIDE AND ALBUTEROL SCH PUFF: 20; 100 SPRAY, METERED RESPIRATORY (INHALATION) at 20:00

## 2020-11-02 RX ADMIN — INSULIN LISPRO SCH UNITS: 100 INJECTION, SOLUTION INTRAVENOUS; SUBCUTANEOUS at 18:02

## 2020-11-02 RX ADMIN — CEFTRIAXONE SCH GM: 1 INJECTION, POWDER, FOR SOLUTION INTRAMUSCULAR; INTRAVENOUS at 05:32

## 2020-11-02 RX ADMIN — LOSARTAN POTASSIUM SCH MG: 50 TABLET ORAL at 17:53

## 2020-11-02 RX ADMIN — DOXYCYCLINE SCH MLS/HR: 100 INJECTION, POWDER, LYOPHILIZED, FOR SOLUTION INTRAVENOUS at 09:15

## 2020-11-02 RX ADMIN — LOPERAMIDE HYDROCHLORIDE PRN MG: 2 CAPSULE ORAL at 18:03

## 2020-11-02 RX ADMIN — NYSTATIN SCH APP: 100000 POWDER TOPICAL at 21:00

## 2020-11-02 RX ADMIN — IPRATROPIUM BROMIDE AND ALBUTEROL SCH PUFF: 20; 100 SPRAY, METERED RESPIRATORY (INHALATION) at 16:00

## 2020-11-02 RX ADMIN — INSULIN LISPRO SCH UNITS: 100 INJECTION, SOLUTION INTRAVENOUS; SUBCUTANEOUS at 21:00

## 2020-11-02 RX ADMIN — Medication SCH CAP: at 09:16

## 2020-11-02 RX ADMIN — IPRATROPIUM BROMIDE AND ALBUTEROL SCH PUFF: 20; 100 SPRAY, METERED RESPIRATORY (INHALATION) at 08:00

## 2020-11-02 RX ADMIN — METHYLPREDNISOLONE SODIUM SUCCINATE SCH MG: 40 INJECTION, POWDER, FOR SOLUTION INTRAMUSCULAR; INTRAVENOUS at 09:16

## 2020-11-02 RX ADMIN — CARVEDILOL SCH MG: 3.12 TABLET, FILM COATED ORAL at 09:16

## 2020-11-02 RX ADMIN — CARVEDILOL SCH MG: 6.25 TABLET, FILM COATED ORAL at 17:56

## 2020-11-02 RX ADMIN — CETIRIZINE HYDROCHLORIDE SCH MG: 10 TABLET, FILM COATED ORAL at 09:16

## 2020-11-02 RX ADMIN — ENOXAPARIN SODIUM SCH MG: 100 INJECTION SUBCUTANEOUS at 09:15

## 2020-11-02 RX ADMIN — NYSTATIN SCH APP: 100000 POWDER TOPICAL at 09:00

## 2020-11-02 RX ADMIN — INSULIN LISPRO SCH UNITS: 100 INJECTION, SOLUTION INTRAVENOUS; SUBCUTANEOUS at 09:33

## 2020-11-02 RX ADMIN — IPRATROPIUM BROMIDE AND ALBUTEROL SCH PUFF: 20; 100 SPRAY, METERED RESPIRATORY (INHALATION) at 12:00

## 2020-11-02 RX ADMIN — ASPIRIN SCH MG: 81 TABLET, COATED ORAL at 09:16

## 2020-11-02 NOTE — NUR
[facility] following for discharge planning. Spoke with RN and reviewed chart. Pt remains COVID 
positive on 4l 02 and IV abx. Pt has a PICC. PT recommendation is home with assistance. [facility] 
spoke with pt who would like [facility] on discharge. Patient choice of vendor form completed. [facility] 
faxed initial referral to [facility]. [facility]A requested 6 min walk for possible home 02 setup. Pt not 
ready for discharge today. [facility] following.

-------------------------------------------------------------------------------

Addendum: 11/02/20 at 1457 by CHRISTIAN THORPE

-------------------------------------------------------------------------------

[facility], [phone], [phone] (fax).

-------------------------------------------------------------------------------

Addendum: 11/02/20 at 1615 by CHRISTIAN THORPE

-------------------------------------------------------------------------------

Spoke with Niurka from [facility] admissions and they can accept this pt for HH at discharge.

## 2020-11-02 NOTE — PDOC
PULMONARY PROGRESS NOTES


DATE: 11/2/20 


TIME: 08:35


Subjective


Still short of breath.  Currently on 4 L.


Vitals





Vital Signs








  Date Time  Temp Pulse Resp B/P (MAP) Pulse Ox O2 Delivery O2 Flow Rate FiO2


 


11/2/20 07:00 96.9 67 18 150/87 (108) 96 Nasal Cannula 4.0 





 96.9       








Comments


Patient seen during the COVID-19 pandemic


alert


NC AT 


RRR


no accessory muscle  use


obese


no rash


Labs





Laboratory Tests








Test


 10/31/20


11:51 10/31/20


15:44 10/31/20


22:28 11/1/20


04:00


 


Glucose (Fingerstick)


 224 mg/dL


(70-99) 247 mg/dL


(70-99) 275 mg/dL


(70-99) 





 


D-Dimer (Willa)


 


 


 


 0.28 ug/mlFEU


(0.00-0.50)


 


Sodium Level


 


 


 


 145 mmol/L


(136-145)


 


Potassium Level


 


 


 


 4.5 mmol/L


(3.5-5.1)


 


Chloride Level


 


 


 


 108 mmol/L


()


 


Carbon Dioxide Level


 


 


 


 28 mmol/L


(21-32)


 


Anion Gap    9 (6-14) 


 


Blood Urea Nitrogen


 


 


 


 20 mg/dL


(7-20)


 


Creatinine


 


 


 


 1.0 mg/dL


(0.6-1.0)


 


Estimated GFR


(Cockcroft-Gault) 


 


 


 55.6 





 


BUN/Creatinine Ratio    20 (6-20) 


 


Glucose Level


 


 


 


 199 mg/dL


(70-99)


 


Calcium Level


 


 


 


 7.8 mg/dL


(8.5-10.1)


 


Total Bilirubin


 


 


 


 0.4 mg/dL


(0.2-1.0)


 


Aspartate Amino Transf


(AST/SGOT) 


 


 


 70 U/L (15-37) 





 


Alanine Aminotransferase


(ALT/SGPT) 


 


 


 91 U/L (14-59) 





 


Alkaline Phosphatase


 


 


 


 53 U/L


()


 


Total Protein


 


 


 


 6.2 g/dL


(6.4-8.2)


 


Albumin


 


 


 


 2.3 g/dL


(3.4-5.0)


 


Albumin/Globulin Ratio    0.6 (1.0-1.7) 


 


Test


 11/1/20


07:39 11/1/20


12:23 11/1/20


16:57 11/1/20


19:42


 


Glucose (Fingerstick)


 219 mg/dL


(70-99) 245 mg/dL


(70-99) 208 mg/dL


(70-99) 246 mg/dL


(70-99)


 


Test


 11/2/20


07:15 


 


 





 


Sodium Level


 144 mmol/L


(136-145) 


 


 





 


Potassium Level


 4.4 mmol/L


(3.5-5.1) 


 


 





 


Chloride Level


 106 mmol/L


() 


 


 





 


Carbon Dioxide Level


 29 mmol/L


(21-32) 


 


 





 


Anion Gap 9 (6-14)    


 


Blood Urea Nitrogen


 23 mg/dL


(7-20) 


 


 





 


Creatinine


 1.0 mg/dL


(0.6-1.0) 


 


 





 


Estimated GFR


(Cockcroft-Gault) 55.6 


 


 


 





 


BUN/Creatinine Ratio 23 (6-20)    


 


Glucose Level


 211 mg/dL


(70-99) 


 


 





 


Calcium Level


 7.9 mg/dL


(8.5-10.1) 


 


 





 


Total Bilirubin


 0.5 mg/dL


(0.2-1.0) 


 


 





 


Aspartate Amino Transf


(AST/SGOT) 50 U/L (15-37) 


 


 


 





 


Alanine Aminotransferase


(ALT/SGPT) 92 U/L (14-59) 


 


 


 





 


Alkaline Phosphatase


 53 U/L


() 


 


 





 


Total Protein


 6.3 g/dL


(6.4-8.2) 


 


 





 


Albumin


 2.4 g/dL


(3.4-5.0) 


 


 





 


Albumin/Globulin Ratio 0.6 (1.0-1.7)    








Laboratory Tests








Test


 11/1/20


12:23 11/1/20


16:57 11/1/20


19:42 11/2/20


07:15


 


Glucose (Fingerstick)


 245 mg/dL


(70-99) 208 mg/dL


(70-99) 246 mg/dL


(70-99) 





 


Sodium Level


 


 


 


 144 mmol/L


(136-145)


 


Potassium Level


 


 


 


 4.4 mmol/L


(3.5-5.1)


 


Chloride Level


 


 


 


 106 mmol/L


()


 


Carbon Dioxide Level


 


 


 


 29 mmol/L


(21-32)


 


Anion Gap    9 (6-14) 


 


Blood Urea Nitrogen


 


 


 


 23 mg/dL


(7-20)


 


Creatinine


 


 


 


 1.0 mg/dL


(0.6-1.0)


 


Estimated GFR


(Cockcroft-Gault) 


 


 


 55.6 





 


BUN/Creatinine Ratio    23 (6-20) 


 


Glucose Level


 


 


 


 211 mg/dL


(70-99)


 


Calcium Level


 


 


 


 7.9 mg/dL


(8.5-10.1)


 


Total Bilirubin


 


 


 


 0.5 mg/dL


(0.2-1.0)


 


Aspartate Amino Transf


(AST/SGOT) 


 


 


 50 U/L (15-37) 





 


Alanine Aminotransferase


(ALT/SGPT) 


 


 


 92 U/L (14-59) 





 


Alkaline Phosphatase


 


 


 


 53 U/L


()


 


Total Protein


 


 


 


 6.3 g/dL


(6.4-8.2)


 


Albumin


 


 


 


 2.4 g/dL


(3.4-5.0)


 


Albumin/Globulin Ratio    0.6 (1.0-1.7) 








Medications





Active Scripts








 Medications  Dose


 Route/Sig


 Max Daily Dose Days Date Category


 


 Cinnamon


  (Cinnamon Bark)


 500 Mg Capsule  500 Mg


 PO DAILY


   10/29/20 Reported


 


 Crestor


  (Rosuvastatin


 Calcium) 5 Mg


 Tablet  5 Mg


 PO HS


   10/29/20 Reported


 


 Losartan


 Potassium 50 Mg


 Tablet  50 Mg


 PO DAILYWSUP


   10/29/20 Reported


 


 Spironolactone 25


 Mg Tablet  1 Tab


 PO DAILY


   10/29/20 Reported


 


 Protonix  **


  (Pantoprazole


 Sodium) 40 Mg


 Tablet.dr  40 Mg


 PO DAILYAC


   10/29/20 Reported


 


 Carvedilol **


  (Carvedilol) 6.25


 Mg Tablet  6.25 Mg


 PO DAILYWSUP


   10/29/20 Reported


 


 Carvedilol **


  (Carvedilol)


 3.125 Mg Tablet  3.125 Mg


 PO DAILY08


   10/29/20 Reported


 


 Claritin


  (Loratadine) 10


 Mg Capsule  1 Cap


 PO DAILY


  30 10/29/20 Reported


 


 Aspirin Ec


  (Aspirin) 81 Mg


 Tablet.dr  1 Tab


 PO DAILY


   10/29/20 Reported











Impression


.


IMPRESSION:


1.  Acute hypoxemic respiratory failure secondary to COVID-19.


2.  COVID-19 viral pneumonia.


3.  Possible bacterial pneumonia.


4.  Fever secondary to above.


5.  Chronic diastolic heart failure.


6.  Severe protein malnutrition present upon admission.


7.  Coronary artery disease with previous stent placement in 2010.


8.  Hyperlipidemia.


9.  Morbid obesity.


10.  Tobacco dependence, in remission.


11.  Chronic obstructive pulmonary disease.





Plan


.


Possible discharge on 11/3 check 6-minute walk


Status post convalescent serum, steroids, antibiotics.


DVT prophylaxis











AIMEE MCLEAN MD               Nov 2, 2020 08:35

## 2020-11-02 NOTE — PDOC
[facility] PROGRESS NOTE


Date of Service


DOS:


DATE: 20 


TIME: 13:40





Chief Complaint


Chief Complaint


A/P:


Acute hypoxia - likely related to community acquired pneumonia vs COVID 19 vs 

acute chf. Will check procalcitonin, BNP and continue empiric rocephin + 

doxycycline. wean O2 as tolerated


Sepsis - with fever, tachycardia, likely related to pneumonia, will stop IVF 

given CHF history and cont empiric antibiotics


COVID 19 - solumedrol, s/p convalescent FFP, thromboprophylaxis, remdesivir not 

indicated given her symptoms >8 days on presentation


Shortness of breath - see above


Hypokalemia - will replace PO, check mag


Moderate protein calorie malnutrition - dietician to see


Hyperglycemia - no hx of DM2, will check a1c, sliding scale insulin


CAD s/p LAD stent x1 in  - trend troponins, EKG not concerning


H/o Diastolic CHF - will check BNP, unclear if this is acute CHF, though it does

appear so.


GERD - ppi


HLD - statin


HTN - hold coreg for low BP, will restart


Morbid obesity - counseled on weight loss, diet, exercise





FEN - Cardiac diet


PPX - lovenox


FULL CODE


Dispo - inpatient





History of Present Illness


History of Present Illness


Ms Pal is a 66 yo F w/ PMHx CAD s/p LAD stent x1 in , diastolic CHF, GERD, 

HLD, HTN, morbid obesity who p/w progressive shortness of breath.  Patient 

states shortness of breath has been ongoing since Thursday 10/22/2020.  She 

states shortness of breath is been progressively becoming worse.  She states she

has associated cough with some sputum production. No significant orthopnea or 

PND or LE edema. She states she has had fevers and chills.  Over the last 

several hours patient states shortness of breath is greatly increased.  On 

arrival she was tachypneic and hypoxic on room air with a saturations in the 80s

requiring O2 administration and febrile to 100.6 F.





Patient states her mother recently passed away and had  on .  

She states her mother was hospitalized for prolonged period of time on the Covid

floor at [facility] but states her mother was never diagnosed with 

Covid.  The  was indoors and many people including visitors from 

[place] refused to wear masks.





EKG with heart rate 87 no ST elevation no ST depression no acute MI


CXR with bilateral interstitial opacities, cardiomegaly and coronary 

calcifications


WBC 5.9, Hb 13.5, platelets 183, , K3.3, BUN 12, CR 1.1, glucose 153, 

lactate 1.4, albumin 2.7, troponin 0.


Admitted for further care





10/30: Having diarrhea today. Achy, short of breath. Afebrile. Advised to start 

steroids and FFP for COVID 19. 


10/31: Afebrile 96% on 5 L.  Achy short of breath diarrhea improved.  Glucose 

little higher on steroids.  Status post FFP.  Feeling may be a little better.  

Not a candidate for remdesivir based on symptoms greater than 8 days.





Afebrile, D-dimer normalized, LFTs improving.  Stable 96% on 5 L nasal cannula 

oxygen. Having PICC placed today given frequent loss of IV access.. Still with 

diarrhea





2020


Afebrile, no acute events overnight.  Saturating 3 to 4 L O2 nasal cannula at 

95%.  Patient's chart, labs, images were reviewed and discussed with RN





Vitals/I&O


Vitals/I&O:





                                   Vital Signs








  Date Time  Temp Pulse Resp B/P (MAP) Pulse Ox O2 Delivery O2 Flow Rate FiO2


 


20 11:00 96.4 65 21 144/74 (97) 95 Nasal Cannula 4.0 





 96.4       














                                    I & O   


 


 20





 15:00 23:00 07:00


 


Intake Total  100 ml 50 ml


 


Balance  100 ml 50 ml











Physical Exam


General:  Alert, Oriented X3, Cooperative, mild distress


Abdomen:  Normal bowel sounds, Soft, No tenderness, No hepatosplenomegaly, No 

masses


Extremities:  No clubbing, No cyanosis, No edema, Normal pulses, No 

tenderness/swelling


Skin:  No rashes, No breakdown, No significant lesion





Labs


Labs:





Laboratory Tests








Test


 20


16:57 20


19:42 20


07:15 20


08:40


 


Glucose (Fingerstick)


 208 mg/dL


(70-99) 246 mg/dL


(70-99) 


 216 mg/dL


(70-99)


 


Sodium Level


 


 


 144 mmol/L


(136-145) 





 


Potassium Level


 


 


 4.4 mmol/L


(3.5-5.1) 





 


Chloride Level


 


 


 106 mmol/L


() 





 


Carbon Dioxide Level


 


 


 29 mmol/L


(21-32) 





 


Anion Gap   9 (6-14)  


 


Blood Urea Nitrogen


 


 


 23 mg/dL


(7-20) 





 


Creatinine


 


 


 1.0 mg/dL


(0.6-1.0) 





 


Estimated GFR


(Cockcroft-Gault) 


 


 55.6 


 





 


BUN/Creatinine Ratio   23 (6-20)  


 


Glucose Level


 


 


 211 mg/dL


(70-99) 





 


Calcium Level


 


 


 7.9 mg/dL


(8.5-10.1) 





 


Total Bilirubin


 


 


 0.5 mg/dL


(0.2-1.0) 





 


Aspartate Amino Transf


(AST/SGOT) 


 


 50 U/L (15-37) 


 





 


Alanine Aminotransferase


(ALT/SGPT) 


 


 92 U/L (14-59) 


 





 


Alkaline Phosphatase


 


 


 53 U/L


() 





 


Total Protein


 


 


 6.3 g/dL


(6.4-8.2) 





 


Albumin


 


 


 2.4 g/dL


(3.4-5.0) 





 


Albumin/Globulin Ratio   0.6 (1.0-1.7)  


 


Test


 20


10:00 20


11:01 


 





 


D-Dimer (Willa)


 0.53 ug/mlFEU


(0.00-0.50) 


 


 





 


Glucose (Fingerstick)


 


 225 mg/dL


(70-99) 


 














Assessment and Plan


Assessmemt and Plan


Problems


Medical Problems:


(1) CHF (congestive heart failure)


Status: Acute  





(2) Dyspnea


Status: Acute  





(3) Hypoxia


Status: Acute  





(4) Person under investigation for COVID-19


Status: Acute  











Comment


Review of Relevant


I have reviewed the following items heather (where applicable) has been applied.


Medications:





Current Medications








 Medications


  (Trade)  Dose


 Ordered  Sig/Sharon


 Route


 PRN Reason  Start Time


 Stop Time Status Last Admin


Dose Admin


 


 Psyllium


 Hydrophilic


 Mucilloid


  (Metamucil Fiber


 Packet)  1 pkt  DAILY16


 PO


   20 16:00


    20 16:00





 


 Methylprednisolone


 Sodium Succinate


  (SOLU-Medrol


 40MG VIAL)  40 mg  Q12HR


 IV


   20 21:00


    20 09:16














Justifications for Admission


Other Justification














DOUG TAVERAS MD                     2020 13:43

## 2020-11-03 VITALS — DIASTOLIC BLOOD PRESSURE: 59 MMHG | SYSTOLIC BLOOD PRESSURE: 106 MMHG

## 2020-11-03 VITALS — DIASTOLIC BLOOD PRESSURE: 65 MMHG | SYSTOLIC BLOOD PRESSURE: 126 MMHG

## 2020-11-03 VITALS — SYSTOLIC BLOOD PRESSURE: 134 MMHG | DIASTOLIC BLOOD PRESSURE: 74 MMHG

## 2020-11-03 VITALS — SYSTOLIC BLOOD PRESSURE: 127 MMHG | DIASTOLIC BLOOD PRESSURE: 64 MMHG

## 2020-11-03 LAB
ALBUMIN SERPL-MCNC: 2.4 G/DL (ref 3.4–5)
ALBUMIN/GLOB SERPL: 0.6 {RATIO} (ref 1–1.7)
ALP SERPL-CCNC: 48 U/L (ref 46–116)
ALT SERPL-CCNC: 85 U/L (ref 14–59)
ANION GAP SERPL CALC-SCNC: 6 MMOL/L (ref 6–14)
AST SERPL-CCNC: 33 U/L (ref 15–37)
BILIRUB SERPL-MCNC: 0.5 MG/DL (ref 0.2–1)
BUN SERPL-MCNC: 24 MG/DL (ref 7–20)
BUN/CREAT SERPL: 24 (ref 6–20)
CALCIUM SERPL-MCNC: 7.6 MG/DL (ref 8.5–10.1)
CHLORIDE SERPL-SCNC: 106 MMOL/L (ref 98–107)
CO2 SERPL-SCNC: 29 MMOL/L (ref 21–32)
CREAT SERPL-MCNC: 1 MG/DL (ref 0.6–1)
GFR SERPLBLD BASED ON 1.73 SQ M-ARVRAT: 55.6 ML/MIN
GLUCOSE SERPL-MCNC: 218 MG/DL (ref 70–99)
POTASSIUM SERPL-SCNC: 4.7 MMOL/L (ref 3.5–5.1)
PROT SERPL-MCNC: 6.1 G/DL (ref 6.4–8.2)
SODIUM SERPL-SCNC: 141 MMOL/L (ref 136–145)

## 2020-11-03 RX ADMIN — ASPIRIN SCH MG: 81 TABLET, COATED ORAL at 09:46

## 2020-11-03 RX ADMIN — IPRATROPIUM BROMIDE AND ALBUTEROL SCH PUFF: 20; 100 SPRAY, METERED RESPIRATORY (INHALATION) at 12:00

## 2020-11-03 RX ADMIN — METHYLPREDNISOLONE SODIUM SUCCINATE SCH MG: 40 INJECTION, POWDER, FOR SOLUTION INTRAMUSCULAR; INTRAVENOUS at 09:46

## 2020-11-03 RX ADMIN — Medication SCH CAP: at 09:46

## 2020-11-03 RX ADMIN — CARVEDILOL SCH MG: 3.12 TABLET, FILM COATED ORAL at 07:59

## 2020-11-03 RX ADMIN — INSULIN LISPRO SCH UNITS: 100 INJECTION, SOLUTION INTRAVENOUS; SUBCUTANEOUS at 09:48

## 2020-11-03 RX ADMIN — PANTOPRAZOLE SODIUM SCH MG: 40 TABLET, DELAYED RELEASE ORAL at 07:57

## 2020-11-03 RX ADMIN — NYSTATIN SCH APP: 100000 POWDER TOPICAL at 10:00

## 2020-11-03 RX ADMIN — DOXYCYCLINE SCH MLS/HR: 100 INJECTION, POWDER, LYOPHILIZED, FOR SOLUTION INTRAVENOUS at 09:45

## 2020-11-03 RX ADMIN — ENOXAPARIN SODIUM SCH MG: 100 INJECTION SUBCUTANEOUS at 09:46

## 2020-11-03 RX ADMIN — Medication SCH CAP: at 00:42

## 2020-11-03 RX ADMIN — ATORVASTATIN CALCIUM SCH MG: 20 TABLET, FILM COATED ORAL at 00:42

## 2020-11-03 RX ADMIN — ENOXAPARIN SODIUM SCH MG: 100 INJECTION SUBCUTANEOUS at 00:43

## 2020-11-03 RX ADMIN — CETIRIZINE HYDROCHLORIDE SCH MG: 10 TABLET, FILM COATED ORAL at 09:46

## 2020-11-03 RX ADMIN — DOXYCYCLINE SCH MLS/HR: 100 INJECTION, POWDER, LYOPHILIZED, FOR SOLUTION INTRAVENOUS at 00:42

## 2020-11-03 RX ADMIN — CEFTRIAXONE SCH GM: 1 INJECTION, POWDER, FOR SOLUTION INTRAMUSCULAR; INTRAVENOUS at 05:54

## 2020-11-03 RX ADMIN — INSULIN LISPRO SCH UNITS: 100 INJECTION, SOLUTION INTRAVENOUS; SUBCUTANEOUS at 12:16

## 2020-11-03 RX ADMIN — METHYLPREDNISOLONE SODIUM SUCCINATE SCH MG: 40 INJECTION, POWDER, FOR SOLUTION INTRAMUSCULAR; INTRAVENOUS at 00:42

## 2020-11-03 RX ADMIN — IPRATROPIUM BROMIDE AND ALBUTEROL SCH PUFF: 20; 100 SPRAY, METERED RESPIRATORY (INHALATION) at 10:00

## 2020-11-03 NOTE — NUR
Discharge Note:



[facility]



Discharge instructions and discharge home medications reviewed with Patient and a copy 
given. All questions have been answered and understanding verbalized. 



The following instructions and handouts were given: COVID 19 discharge, prednisone



Patient discharged to home with self care via wheelchair.

## 2020-11-03 NOTE — PDOC
PULMONARY PROGRESS NOTES


DATE: 11/3/20 


TIME: 08:30


Subjective


Patient feels about the same currently on


Vitals





Vital Signs








  Date Time  Temp Pulse Resp B/P (MAP) Pulse Ox O2 Delivery O2 Flow Rate FiO2


 


11/3/20 07:59  73  134/74    


 


11/3/20 07:16 97.4  18  95   





 97.4       


 


11/2/20 23:00      Room Air  


 


11/2/20 19:36       4.0 








Comments


Patient seen during the COVID-19 pandemic


alert


NC AT 


RRR


no accessory muscle  use


obese


no rash


Labs





Laboratory Tests








Test


 11/1/20


12:23 11/1/20


16:57 11/1/20


19:42 11/2/20


07:15


 


Glucose (Fingerstick)


 245 mg/dL


(70-99) 208 mg/dL


(70-99) 246 mg/dL


(70-99) 





 


Sodium Level


 


 


 


 144 mmol/L


(136-145)


 


Potassium Level


 


 


 


 4.4 mmol/L


(3.5-5.1)


 


Chloride Level


 


 


 


 106 mmol/L


()


 


Carbon Dioxide Level


 


 


 


 29 mmol/L


(21-32)


 


Anion Gap    9 (6-14) 


 


Blood Urea Nitrogen


 


 


 


 23 mg/dL


(7-20)


 


Creatinine


 


 


 


 1.0 mg/dL


(0.6-1.0)


 


Estimated GFR


(Cockcroft-Gault) 


 


 


 55.6 





 


BUN/Creatinine Ratio    23 (6-20) 


 


Glucose Level


 


 


 


 211 mg/dL


(70-99)


 


Calcium Level


 


 


 


 7.9 mg/dL


(8.5-10.1)


 


Total Bilirubin


 


 


 


 0.5 mg/dL


(0.2-1.0)


 


Aspartate Amino Transf


(AST/SGOT) 


 


 


 50 U/L (15-37) 





 


Alanine Aminotransferase


(ALT/SGPT) 


 


 


 92 U/L (14-59) 





 


Alkaline Phosphatase


 


 


 


 53 U/L


()


 


Total Protein


 


 


 


 6.3 g/dL


(6.4-8.2)


 


Albumin


 


 


 


 2.4 g/dL


(3.4-5.0)


 


Albumin/Globulin Ratio    0.6 (1.0-1.7) 


 


Test


 11/2/20


08:40 11/2/20


10:00 11/2/20


11:01 11/2/20


16:37


 


Glucose (Fingerstick)


 216 mg/dL


(70-99) 


 225 mg/dL


(70-99) 229 mg/dL


(70-99)


 


D-Dimer (Willa)


 


 0.53 ug/mlFEU


(0.00-0.50) 


 





 


Test


 11/3/20


00:54 11/3/20


05:55 11/3/20


07:05 





 


Glucose (Fingerstick)


 219 mg/dL


(70-99) 


 


 





 


Sodium Level


 


 141 mmol/L


(136-145) 


 





 


Potassium Level


 


 4.7 mmol/L


(3.5-5.1) 


 





 


Chloride Level


 


 106 mmol/L


() 


 





 


Carbon Dioxide Level


 


 29 mmol/L


(21-32) 


 





 


Anion Gap  6 (6-14)   


 


Blood Urea Nitrogen


 


 24 mg/dL


(7-20) 


 





 


Creatinine


 


 1.0 mg/dL


(0.6-1.0) 


 





 


Estimated GFR


(Cockcroft-Gault) 


 55.6 


 


 





 


BUN/Creatinine Ratio  24 (6-20)   


 


Glucose Level


 


 218 mg/dL


(70-99) 


 





 


Calcium Level


 


 7.6 mg/dL


(8.5-10.1) 


 





 


Total Bilirubin


 


 0.5 mg/dL


(0.2-1.0) 


 





 


Aspartate Amino Transf


(AST/SGOT) 


 33 U/L (15-37) 


 


 





 


Alanine Aminotransferase


(ALT/SGPT) 


 85 U/L (14-59) 


 


 





 


Alkaline Phosphatase


 


 48 U/L


() 


 





 


Total Protein


 


 6.1 g/dL


(6.4-8.2) 


 





 


Albumin


 


 2.4 g/dL


(3.4-5.0) 


 





 


Albumin/Globulin Ratio  0.6 (1.0-1.7)   


 


D-Dimer (Willa)


 


 


 0.40 ug/mlFEU


(0.00-0.50) 











Laboratory Tests








Test


 11/2/20


08:40 11/2/20


10:00 11/2/20


11:01 11/2/20


16:37


 


Glucose (Fingerstick)


 216 mg/dL


(70-99) 


 225 mg/dL


(70-99) 229 mg/dL


(70-99)


 


D-Dimer (Willa)


 


 0.53 ug/mlFEU


(0.00-0.50) 


 





 


Test


 11/3/20


00:54 11/3/20


05:55 11/3/20


07:05 





 


Glucose (Fingerstick)


 219 mg/dL


(70-99) 


 


 





 


Sodium Level


 


 141 mmol/L


(136-145) 


 





 


Potassium Level


 


 4.7 mmol/L


(3.5-5.1) 


 





 


Chloride Level


 


 106 mmol/L


() 


 





 


Carbon Dioxide Level


 


 29 mmol/L


(21-32) 


 





 


Anion Gap  6 (6-14)   


 


Blood Urea Nitrogen


 


 24 mg/dL


(7-20) 


 





 


Creatinine


 


 1.0 mg/dL


(0.6-1.0) 


 





 


Estimated GFR


(Cockcroft-Gault) 


 55.6 


 


 





 


BUN/Creatinine Ratio  24 (6-20)   


 


Glucose Level


 


 218 mg/dL


(70-99) 


 





 


Calcium Level


 


 7.6 mg/dL


(8.5-10.1) 


 





 


Total Bilirubin


 


 0.5 mg/dL


(0.2-1.0) 


 





 


Aspartate Amino Transf


(AST/SGOT) 


 33 U/L (15-37) 


 


 





 


Alanine Aminotransferase


(ALT/SGPT) 


 85 U/L (14-59) 


 


 





 


Alkaline Phosphatase


 


 48 U/L


() 


 





 


Total Protein


 


 6.1 g/dL


(6.4-8.2) 


 





 


Albumin


 


 2.4 g/dL


(3.4-5.0) 


 





 


Albumin/Globulin Ratio  0.6 (1.0-1.7)   


 


D-Dimer (Willa)


 


 


 0.40 ug/mlFEU


(0.00-0.50) 











Medications





Active Scripts








 Medications  Dose


 Route/Sig


 Max Daily Dose Days Date Category


 


 Cinnamon


  (Cinnamon Bark)


 500 Mg Capsule  500 Mg


 PO DAILY


   10/29/20 Reported


 


 Crestor


  (Rosuvastatin


 Calcium) 5 Mg


 Tablet  5 Mg


 PO HS


   10/29/20 Reported


 


 Losartan


 Potassium 50 Mg


 Tablet  50 Mg


 PO DAILYWSUP


   10/29/20 Reported


 


 Spironolactone 25


 Mg Tablet  1 Tab


 PO DAILY


   10/29/20 Reported


 


 Protonix  **


  (Pantoprazole


 Sodium) 40 Mg


 Tablet.dr  40 Mg


 PO DAILYAC


   10/29/20 Reported


 


 Carvedilol **


  (Carvedilol) 6.25


 Mg Tablet  6.25 Mg


 PO DAILYWSUP


   10/29/20 Reported


 


 Carvedilol **


  (Carvedilol)


 3.125 Mg Tablet  3.125 Mg


 PO DAILY08


   10/29/20 Reported


 


 Claritin


  (Loratadine) 10


 Mg Capsule  1 Cap


 PO DAILY


  30 10/29/20 Reported


 


 Aspirin Ec


  (Aspirin) 81 Mg


 Tablet.dr  1 Tab


 PO DAILY


   10/29/20 Reported











Impression


.


IMPRESSION:


1.  Acute hypoxemic respiratory failure secondary to COVID-19.


2.  COVID-19 viral pneumonia.


3.  Possible bacterial pneumonia.


4.  Fever secondary to above.


5.  Chronic diastolic heart failure.


6.  Severe protein malnutrition present upon admission.


7.  Coronary artery disease with previous stent placement in 2010.


8.  Hyperlipidemia.


9.  Morbid obesity.


10.  Tobacco dependence, in remission.


11.  Chronic obstructive pulmonary disease.





Plan


.


6-minute walk, okay to discharge from my standpoint of view follow-up in the 

office in December


Status post convalescent serum, steroids, antibiotics.


DVT prophylaxis











AIMEE MCLEAN MD               Nov 3, 2020 08:30

## 2020-11-03 NOTE — NUR
SW following for discharge planning. Spoke with RN and reviewed chart. Pt discharged home 
today, self-care. SW called pt who stated she was at the pharmacy getting her medications 
and then going home. Pt stated she changed her mind about wanting HH and declined HH orders 
on discharge. Pt told to call PCP if she changes her mind and that [facility] had accepted her 
clinically. 6 min walk indicated no 02 setup needed on discharge. No further SW needs at 
this time.

## 2020-11-03 NOTE — DISCH
DISCHARGE INSTRUCTIONS


Condition on Discharge


Condition on Discharge:  Stable





Activity After Discharge


Activity Instructions for Disc:  Activity as tolerated


Driving Instructions after Dis:  Do not drive today





Diet after Discharge


Diet after Discharge:  Cardiac





Checks after Discharge


DC Comment:  CBC, CMP within 1 week of discharge





Follow-Up


Follow up with:  PCP within 2 weeks of discharge


Follow Up With:  Pulmonology as needed











DOUG TAVERAS MD                     Nov 3, 2020 14:18

## 2020-11-05 NOTE — PDOC3
[facility]-Discharge Summary


Date of Admission:


Date of Admission:  Oct 29, 2020





Date of Discharge:


Date of Discharge:  Nov 3, 2020





Hospital Course:


Hospital Course:


64 yo F w/ PMHx CAD s/p LAD stent x1 in , diastolic CHF, GERD, HLD, HTN, 

morbid obesity who p/w progressive shortness of breath.  Patient states 

shortness of breath has been ongoing since Thursday 10/22/2020.  She states 

shortness of breath is been progressively becoming worse.  She states she has 

associated cough with some sputum production. No significant orthopnea or PND or

LE edema. She states she has had fevers and chills.  Over the last several hours

patient states shortness of breath is greatly increased.  On arrival she was 

tachypneic and hypoxic on room air with a saturations in the 80s requiring O2 

administration and febrile to 100.6 F.





Patient states her mother recently passed away and had  on .  

She states her mother was hospitalized for prolonged period of time on the Covid

floor at [facility] but states her mother was never diagnosed with 

Covid.  The  was indoors and many people including visitors from 

[place] refused to wear masks.





EKG with heart rate 87 no ST elevation no ST depression no acute MI


CXR with bilateral interstitial opacities, cardiomegaly and coronary 

calcifications


WBC 5.9, Hb 13.5, platelets 183, , K3.3, BUN 12, CR 1.1, glucose 153, 

lactate 1.4, albumin 2.7, troponin 0.


Admitted for further care





Treated with IV steroids and plasma.  Was able to saturate well on 3-4 L O2 on 

NC. Completed 6 minute walk test before discharge and was not needing any home 

O2.  The rest of the hospital course was unevnetful.





Activity:


Activity:


Resume previous activity





Medications:


Home Meds


Active Scripts


Prednisone (PREDNISONE) 20 Mg Tablet, 20 MG PO DAILY for covid for 4 Days, #4 

TAB


   Prov:DOUG TAVERAS MD         11/3/20


Prednisone (PREDNISONE **) 10 Mg Tablet, 30 MG PO DAILY for covid for 3 Days, #9

TAB


   Prov:DOUG TAVERAS MD         11/3/20


Prednisone (PREDNISONE) 20 Mg Tablet, 40 MG PO DAILY for covid for 3 Days, #6 

TAB


   Prov:DOUG TAVERAS MD         11/3/20


Reported Medications


Cinnamon Bark (CINNAMON) 500 Mg Capsule, 500 MG PO DAILY for supplement, CAP


   10/29/20


Rosuvastatin Calcium (CRESTOR) 5 Mg Tablet, 5 MG PO HS for FOR CHOLESTEROL, #30 

TAB 0 Refills


   10/29/20


Losartan Potassium (LOSARTAN POTASSIUM) 50 Mg Tablet, 50 MG PO DAILYWSUP for 

HYPERTENSION, TAB


   10/29/20


Spironolactone (SPIRONOLACTONE) 25 Mg Tablet, 1 TAB PO DAILY for CHF, #90 TAB 1 

Refill


   10/29/20


Pantoprazole Sodium (PROTONIX  **) 40 Mg Tablet.dr, 40 MG PO DAILYAC for GERD, 

TAB


   10/29/20


Carvedilol (CARVEDILOL **) 6.25 Mg Tablet, 6.25 MG PO DAILYWSUP for CARDIAC, TAB


   10/29/20


Carvedilol (CARVEDILOL **) 3.125 Mg Tablet, 3.125 MG PO DAILY08 for CARDIAC, TAB


   10/29/20


Loratadine (CLARITIN) 10 Mg Capsule, 1 CAP PO DAILY for allergy symptoms for 30 

Days, #30 CAP 0 Refills


   10/29/20


Aspirin (ASPIRIN EC) 81 Mg Tablet.dr, 1 TAB PO DAILY for CAD, #30 TAB 3 Refills


   10/29/20


Scheduled


Aspirin (Aspirin Ec), 1 TAB PO DAILY, (Reported)


Carvedilol (Carvedilol **), 3.125 MG PO DAILY08, (Reported)


Carvedilol (Carvedilol **), 6.25 MG PO DAILYWSUP, (Reported)


Cinnamon Bark (Cinnamon), 500 MG PO DAILY, (Reported)


Loratadine (Claritin), 1 CAP PO DAILY, (Reported)


Losartan Potassium (Losartan Potassium), 50 MG PO DAILYWSUP, (Reported)


Pantoprazole Sodium (Protonix  **), 40 MG PO DAILYAC, (Reported)


Prednisone (Prednisone), 40 MG PO DAILY


Prednisone (Prednisone **), 30 MG PO DAILY


Prednisone (Prednisone), 20 MG PO DAILY


Rosuvastatin Calcium (Crestor), 5 MG PO HS, (Reported)


Spironolactone (Spironolactone), 1 TAB PO DAILY, (Reported)





Total Time:


Total Time:


Total time spent was  40  minutes in preparing scripts, discharge planning with 

SW and RN, and preparing this discharge summary. 








Patient seen and examined on day of discharge.





Justicifation of Admission Dx:


Justifications for Admission:


Justification of Admission Dx:  Yes


Respiratory Failure:  Severe Resp Distress











DOUG TAVERAS MD                     2020 17:01

## 2023-11-01 NOTE — RAD
EXAM: Chest, single view; chest, single view.

 

HISTORY: PICC line placement.

 

COMPARISON: 10/29/2020

 

FINDINGS: Frontal views of the chest are obtained. The initial image 

demonstrates a right PICC with the tip in the right atrium. The second 

image demonstrates slight retraction of the catheter with the tip 

overlying the superior cavoatrial junction. There is stable diffuse 

infiltrate and there are stable suspected small pleural effusions. There 

is a stable prominent cardiac silhouette.

 

IMPRESSION: 

1. Interval positioning and repositioning of a right PICC with the tip at 

the superior cavoatrial junction on the final image.

2. Stable diffuse infiltrate and small pleural effusions.

 

Electronically signed by: Alysia Elizabeth MD (11/1/2020 10:55 AM) [id number] Complex [facility] Text: The defect edges were debeveled with a #15 scalpel blade.  The primary defect was closed partially with a complex linear closure.  Given the location of the defect, shape of the defect and the proximity to free margins a full thickness skin graft was deemed most appropriate to repair the remaining defect.  The graft was trimmed to fit the size of the remaining defect.  The graft was then placed in the primary defect, oriented appropriately, and sutured into place.